# Patient Record
Sex: FEMALE | Race: WHITE | Employment: FULL TIME | URBAN - METROPOLITAN AREA
[De-identification: names, ages, dates, MRNs, and addresses within clinical notes are randomized per-mention and may not be internally consistent; named-entity substitution may affect disease eponyms.]

---

## 2018-01-31 ENCOUNTER — OFFICE VISIT (OUTPATIENT)
Dept: SURGERY | Age: 58
End: 2018-01-31

## 2018-01-31 VITALS
SYSTOLIC BLOOD PRESSURE: 130 MMHG | BODY MASS INDEX: 37.39 KG/M2 | HEIGHT: 64 IN | WEIGHT: 219 LBS | OXYGEN SATURATION: 94 % | DIASTOLIC BLOOD PRESSURE: 90 MMHG | TEMPERATURE: 98.3 F | RESPIRATION RATE: 20 BRPM | HEART RATE: 60 BPM

## 2018-01-31 DIAGNOSIS — Z98.84 S/P GASTRIC BYPASS: ICD-10-CM

## 2018-01-31 DIAGNOSIS — D50.9 IRON DEFICIENCY ANEMIA, UNSPECIFIED IRON DEFICIENCY ANEMIA TYPE: ICD-10-CM

## 2018-01-31 DIAGNOSIS — G35 MS (MULTIPLE SCLEROSIS) (HCC): ICD-10-CM

## 2018-01-31 DIAGNOSIS — E66.01 MORBID OBESITY (HCC): Primary | ICD-10-CM

## 2018-01-31 DIAGNOSIS — E53.8 VITAMIN B12 DEFICIENCY: ICD-10-CM

## 2018-01-31 DIAGNOSIS — K90.9 INTESTINAL MALABSORPTION, UNSPECIFIED TYPE: ICD-10-CM

## 2018-01-31 DIAGNOSIS — E51.9 VITAMIN B1 DEFICIENCY: ICD-10-CM

## 2018-01-31 DIAGNOSIS — R10.12 LEFT UPPER QUADRANT PAIN: ICD-10-CM

## 2018-01-31 DIAGNOSIS — E55.9 VITAMIN D DEFICIENCY: ICD-10-CM

## 2018-01-31 DIAGNOSIS — K50.919 CROHN'S DISEASE WITH COMPLICATION, UNSPECIFIED GASTROINTESTINAL TRACT LOCATION (HCC): ICD-10-CM

## 2018-01-31 RX ORDER — GABAPENTIN 600 MG/1
400 TABLET ORAL 3 TIMES DAILY
COMMUNITY

## 2018-01-31 NOTE — MR AVS SNAPSHOT
[address] [facility] [id number] 
[phone] Patient: Kalin Herrera MRN: [id number] :1960 Visit Information Date & Time Provider Department Dept. Phone Encounter #  
 2018 11:20 AM Noy Howell NP [facility] 22 [phone] [id number] Upcoming Health Maintenance Date Due Hepatitis C Screening 1960 DTaP/Tdap/Td series (1 - Tdap) 1982 PAP AKA CERVICAL CYTOLOGY 1982 FOBT Q 1 YEAR AGE 50-75 2011 BREAST CANCER SCRN MAMMOGRAM 2016 Influenza Age 5 to Adult 2017 Allergies as of 2018  Review Complete On: 2018 By: Nory Hinson LPN Severity Noted Reaction Type Reactions Morphine  2011    Itching Other Medication  2011    Nausea and Vomiting  
 anesthesia Current Immunizations  Never Reviewed Name Date Influenza Vaccine (Quad) PF 10/6/2016 12:02 PM  
  
 Not reviewed this visit You Were Diagnosed With   
  
 Codes Comments Morbid obesity ([facility] [address]    -  Primary ICD-10-CM: E66.01 
ICD-9-CM: 278.01 Left upper quadrant pain     ICD-10-CM: R10.12 ICD-9-CM: 789.02 Intestinal malabsorption, unspecified type     ICD-10-CM: K90.9 ICD-9-CM: 579.9 S/P gastric bypass     ICD-10-CM: Z26.03 ICD-9-CM: V45.86 Crohn's disease with complication, unspecified gastrointestinal tract location [facility])     ICD-10-CM: E63.662 ICD-9-CM: 555.9 MS (multiple sclerosis) ([facility] [address]     ICD-10-CM: G35 
ICD-9-CM: 482 Vitamin B12 deficiency     ICD-10-CM: E53.8 ICD-9-CM: 266.2 Vitamin D deficiency     ICD-10-CM: E55.9 ICD-9-CM: 268.9 Iron deficiency anemia, unspecified iron deficiency anemia type     ICD-10-CM: D50.9 ICD-9-CM: 280.9 Vitamin B1 deficiency     ICD-10-CM: E51.9 ICD-9-CM: 265.1 BMI 38.0-38.9,adult     ICD-10-CM: Q56.92 
ICD-9-CM: V85.38 Vitals BP Pulse Temp Resp Height(growth percentile) Weight(growth percentile) 130/90 60 98.3 °F (36.8 °C) 20 5' 3.5\" (1.613 m) 219 lb (99.3 kg) SpO2 BMI OB Status Smoking Status 94% 38.19 kg/m2 Hysterectomy Never Smoker Vitals History BMI and BSA Data Body Mass Index Body Surface Area  
 38.19 kg/m 2 2.11 m 2 Preferred Pharmacy Pharmacy Name Phone [facility] - [place] [address], [address] [phone] Your Updated Medication List  
  
   
This list is accurate as of: 1/31/18 11:59 AM.  Always use your most recent med list.  
  
  
  
  
 ALPRAZolam 0.5 mg tablet Commonly known as:  Jaclyn Feather Take 0.5 mg by mouth nightly as needed for Anxiety. CHOLESTYRAMINE LIGHT 4 gram powder Generic drug:  Cholestyramine-Aspartame Take 9 g by mouth two (2) times daily (with meals). cyclobenzaprine 10 mg tablet Commonly known as:  FLEXERIL Take  by mouth three (3) times daily as needed for Muscle Spasm(s). CYMBALTA 60 mg capsule Generic drug:  DULoxetine Take 60 mg by mouth nightly.  
  
 ergocalciferol 50,000 unit capsule Commonly known as:  ERGOCALCIFEROL Take 1 Cap by mouth every Monday, Wednesday, Friday. Indications: VITAMIN D DEFICIENCY (HIGH DOSE THERAPY) FIORICET -40 mg per tablet Generic drug:  butalbital-acetaminophen-caffeine Take 1 Tab by mouth.  
  
 gabapentin 600 mg tablet Commonly known as:  NEURONTIN Take  by mouth three (3) times daily. GILENYA PO Take 0.5 mg by mouth daily. MULTIVITAMIN PO Take  by mouth. OCREVUS IV  
by IntraVENous route. oxyCODONE-acetaminophen 5-325 mg per tablet Commonly known as:  PERCOCET Take 1-2 Tabs by mouth every four (4) hours as needed. Max Daily Amount: 12 Tabs. PENTASA PO Take 250 mg by mouth four (4) times daily. trimethoprim 100 mg tablet Commonly known as:  TRIMPEX Take 100 mg by mouth nightly. URIBEL 118-10-40.8-36 mg Cap capsule Generic drug:  Mth-Me Blue-Sod Phos-PhSal-Hyo  
TAKE ONE CAPSULE BY MOUTH THREE TIMES A DAY AS NEEDED  
  
 VITAMIN B-12 1,000 mcg/mL injection Generic drug:  cyanocobalamin  
1,000 mcg by IntraMUSCular route every fourteen (14) days. Indications: VITAMIN B12 DEFICIENCY We Performed the Following CBC W/O DIFF [23062 CPT(R)] IRON PROFILE [id number] CPT(R)] METABOLIC PANEL, COMPREHENSIVE [75453 CPT(R)] PTH INTACT [35533 CPT(R)] VITAMIN B1, WHOLE BLOOD [id number] CPT(R)] VITAMIN B12 & FOLATE [32724 CPT(R)] VITAMIN D, 25 HYDROXY [id number] CPT(R)] To-Do List   
 01/31/2018 Imaging:  CT ABD PELV W WO CONT Referral Information Referral ID Referred By Referred To [id number] Farhatann[facility] Walkerrobbie Not Available Visits Status Start Date End Date 1 New Request 1/31/18 1/31/19 If your referral has a status of pending review or denied, additional information will be sent to support the outcome of this decision. Introducing [facility]! Dear Leisa Eric: 
Thank you for requesting a [facility] account. Our records indicate that you already have an active [facility] account. You can access your account anytime at [email or web address]. [email or web address] Did you know that you can access your hospital and ER discharge instructions at any time in [facility]? You can also review all of your test results from your hospital stay or ER visit. Additional Information If you have questions, please visit the Frequently Asked Questions section of the [facility] website at [email or web address]. [email or web address] Remember, [facility] is NOT to be used for urgent needs. For medical emergencies, dial 911. Now available from your iPhone and Android! Please provide this summary of care documentation to your next provider. Your primary care clinician is listed as Deo Flores.  If you have any questions after today's visit, please call [phone].

## 2018-01-31 NOTE — PATIENT INSTRUCTIONS
CT Scan of the Abdomen: About This Test  What is it? A CT (computed tomography) scan uses X-rays to make detailed pictures of your body and structures inside your body. A CT scan of the abdomen (belly) can give your doctor information about your liver, pancreas, kidneys, and other structures in your belly. During the test, you will lie on a table that is attached to the [employer]. The CT scanner is a large doughnut-shaped machine. Why is this test done? A CT scan of the belly can help find problems such as kidney stones, infected pouches in the colon (diverticulitis), and appendicitis. It also helps find tumors and abscesses. How can you prepare for the test?  Talk to your doctor about all your health conditions before the test. For example, tell your doctor if:  · You are or might be pregnant. · You are allergic to any medicines. · You have diabetes. · You take metformin. · You are breastfeeding. · You get nervous in confined spaces. You may need medicine to help you relax. · You have had an X-ray test using barium contrast material in the past 4 days. You may be asked to not eat any solid foods starting the night before your scan. What happens before the test?  · You may have to take off jewelry. · You will take off all or most of your clothes and change into a gown. If you do leave some clothes on, make sure you take everything out of your pockets. · You may have contrast material (dye) put into your arm through a tube called an IV. Or, you may drink the contrast material or it may be put through a tube into your bladder or rectum. Contrast material helps doctors see specific organs, blood vessels, and most tumors. What happens during the test?  · You will lie on a table that is attached to the CT scanner. · The table slides into the round opening of the scanner. The table will move during the scan. The scanner moves inside the doughnut-shaped casing around your body.   · You will be asked to hold still during the scan. You may be asked to hold your breath for short periods. · You may be alone in the scanning room, but a technologist will be watching you through a window and talking with you during the test.  What else should you know about the test?  · A CT scan does not hurt. · If a dye is used, you may feel a quick sting or pinch when the IV is started. The dye may make you feel warm and flushed and give you a metallic taste in your mouth. Some people feel sick to their stomach or get a headache. · If you breastfeed and are concerned about whether the dye used in this test is safe, talk to your doctor. Most experts believe that very little dye passes into breast milk and even less is passed on to the baby. But if you prefer, you can store some of your breast milk ahead of time and use it for a day or two after the test.  · The dose of radiation from a CT scanner may be higher than that from other X-ray tests. If you are concerned about the radiation risk, talk to your doctor. How long does the test take? · The test will take about 30 to 60 minutes. Most of this time is spent getting ready for the scan. The actual test only takes a few minutes. What happens after the test?  · You will probably be able to go home right away. · You can go back to your usual activities right away. · Drink plenty of fluids for 24 hours after the test if dye was used, unless your doctor tells you not to. When should you call for help? Watch closely for changes in your health, and be sure to contact your doctor if you have any problems. Follow-up care is a key part of your treatment and safety. Be sure to make and go to all appointments, and call your doctor if you are having problems. It's also a good idea to keep a list of the medicines you take. Ask your doctor when you can expect to have your test results. Where can you learn more? Go to http://howard-jessica.info/.   Enter O406 in the search box to learn more about \"CT Scan of the Abdomen: About This Test.\"  Current as of: October 14, 2016  Content Version: 11.4  © 7891-3659 [employer]wise, [employer]. Care instructions adapted under license by [employer] (which disclaims liability or warranty for this information). If you have questions about a medical condition or this instruction, always ask your healthcare professional. [place] [address] any warranty or liability for your use of this information.

## 2018-01-31 NOTE — PROGRESS NOTES
Cydney Lyons is a 63 y.o. female 13 years status post gastric bypass, down 79.5 pounds. 1 year 3 months s/p removal of silastic ring removal. Weight today is 219 pounds. Patient comes in office today with complaint of intermittent left upper quadrant pain. Patient stated that pain is sharp and associated with eating. Stated pain most happens approximately 15 minutes after eating and occurs with more textured foods. Stated pain will not occur with eating soups and other soft foods. No pain medications in use. Stated pain may last 15-30 minutes. Denies nausea, no vomiting, no heartburn/reflux. Denies dysphagia. No fever/no chills, no shortness of breath, no chest pain. Current under care of neurologist and urologist due to poor bladder emptying, stated she is to have neuro stimulator placed for bladder. Patient stated she also need to follow up with GI Dr. Tej Pastor, patient with history of Crohns. Currently on Pentasa. Stated eating a lot of tuna fish for protein. Protein supplementation not in use. Drinking at lest 60 ounces of water daily. No soda drinking. Denies eating/drinking together. Tolerating all vitamins and medications. Denies smoking, no alcohol, and no NSAID usage. No issue with bowel habits. HPI    Review of Systems   Constitutional: Negative for chills, fever and malaise/fatigue. Respiratory: Negative for cough, sputum production and shortness of breath. Cardiovascular: Negative for chest pain, palpitations and leg swelling. Gastrointestinal: Positive for abdominal pain. Negative for blood in stool, constipation, diarrhea, heartburn, nausea and vomiting. Genitourinary: Positive for dysuria and urgency. Negative for flank pain. Musculoskeletal: Positive for myalgias. Neurological: Negative for dizziness. Physical Exam   Constitutional: She is oriented to person, place, and time. She appears well-developed and well-nourished. No distress.    Cardiovascular: Normal rate, regular rhythm and normal heart sounds. Pulmonary/Chest: Effort normal and breath sounds normal. No respiratory distress. She has no wheezes. She has no rales. Abdominal: Soft. Bowel sounds are normal. She exhibits no distension. There is tenderness. There is no rebound and no guarding. Previous surgical sites dry and intact. No hernia/masses palpated. Mild tenderness with deep palpation to left upper quadrant only. Musculoskeletal: Normal range of motion. She exhibits no edema. Neurological: She is alert and oriented to person, place, and time. Skin: Skin is warm and dry. No rash noted. No erythema. Psychiatric: She has a normal mood and affect. Her behavior is normal. Thought content normal.   Blood pressure 130/90, pulse 60, temperature 98.3 °F (36.8 °C), resp. rate 20, height 5' 3.5\" (1.613 m), weight 219 lb (99.3 kg), SpO2 94 %. ASSESSMENT and PLAN  Morbid Obesity 13 years status post gastric bypass, down 79.5 pounds. 1 year 3 months s/p removal of silastic ring removal. Weight today is 219 pounds. Left upper quadrant pain    Patient to be scheduled for CT scan of abdomen to evaluate gastric bypass anatomy, hernia, etc. Advised patient to continue with soft diet as tolerated with emphasis on protein. Continue with hydration and behaviors of no eating/drinking together. Continue vitamin regiment daily. Provided patient with routine lab work slip. Advised anything concerning with labs, will contact patient prior to next visit. Patient to also follow up with GI specialist for possible endoscopy. Patient to follow up in status post CT scan. year. Patient verbalized understanding and questions were answered to the best of my knowledge and ability. CT scan educational materials were provided. Advised to call office if any questions/concerns. 19 minutes was spent with patient.

## 2018-01-31 NOTE — PROGRESS NOTES
1. Have you been to the ER, urgent care clinic since your last visit? Hospitalized since your last visit? yes Pt first for pneumonia    2. Have you seen or consulted any other health care providers outside of the [address] [place] [address] since your last visit? Include any pap smears or colon screening.  Neurology,

## 2018-02-19 ENCOUNTER — HOSPITAL ENCOUNTER (OUTPATIENT)
Dept: CT IMAGING | Age: 58
Discharge: HOME | End: 2018-02-19
Attending: NURSE PRACTITIONER
Payer: COMMERCIAL

## 2018-02-19 DIAGNOSIS — K90.9 INTESTINAL MALABSORPTION, UNSPECIFIED TYPE: ICD-10-CM

## 2018-02-19 DIAGNOSIS — E53.8 VITAMIN B12 DEFICIENCY: ICD-10-CM

## 2018-02-19 DIAGNOSIS — K50.919 CROHN'S DISEASE WITH COMPLICATION, UNSPECIFIED GASTROINTESTINAL TRACT LOCATION (HCC): ICD-10-CM

## 2018-02-19 DIAGNOSIS — E66.01 MORBID OBESITY (HCC): ICD-10-CM

## 2018-02-19 DIAGNOSIS — D50.9 IRON DEFICIENCY ANEMIA, UNSPECIFIED IRON DEFICIENCY ANEMIA TYPE: ICD-10-CM

## 2018-02-19 DIAGNOSIS — E55.9 VITAMIN D DEFICIENCY: ICD-10-CM

## 2018-02-19 DIAGNOSIS — R10.12 LEFT UPPER QUADRANT PAIN: ICD-10-CM

## 2018-02-19 DIAGNOSIS — G35 MS (MULTIPLE SCLEROSIS) (HCC): ICD-10-CM

## 2018-02-19 DIAGNOSIS — Z98.84 S/P GASTRIC BYPASS: ICD-10-CM

## 2018-02-19 PROCEDURE — 74011000258 HC RX REV CODE- 258: Performed by: NURSE PRACTITIONER

## 2018-02-19 PROCEDURE — 74011636320 HC RX REV CODE- 636/320: Performed by: NURSE PRACTITIONER

## 2018-02-19 PROCEDURE — 74177 CT ABD & PELVIS W/CONTRAST: CPT

## 2018-02-19 RX ORDER — SODIUM CHLORIDE 0.9 % (FLUSH) 0.9 %
10 SYRINGE (ML) INJECTION
Status: COMPLETED | OUTPATIENT
Start: 2018-02-19 | End: 2018-02-19

## 2018-02-19 RX ADMIN — SODIUM CHLORIDE 100 ML: 900 INJECTION, SOLUTION INTRAVENOUS at 10:04

## 2018-02-19 RX ADMIN — Medication 10 ML: at 10:04

## 2018-02-19 RX ADMIN — IOHEXOL 50 ML: 240 INJECTION, SOLUTION INTRATHECAL; INTRAVASCULAR; INTRAVENOUS; ORAL at 08:22

## 2018-02-19 RX ADMIN — IOPAMIDOL 100 ML: 755 INJECTION, SOLUTION INTRAVENOUS at 10:04

## 2018-02-22 ENCOUNTER — TELEPHONE (OUTPATIENT)
Dept: SURGERY | Age: 58
End: 2018-02-22

## 2018-02-22 LAB
25(OH)D3+25(OH)D2 SERPL-MCNC: 5.7 NG/ML (ref 30–100)
ALBUMIN SERPL-MCNC: 4.4 G/DL (ref 3.5–5.5)
ALBUMIN/GLOB SERPL: 1.8 {RATIO} (ref 1.2–2.2)
ALP SERPL-CCNC: 92 IU/L (ref 39–117)
ALT SERPL-CCNC: 9 IU/L (ref 0–32)
AST SERPL-CCNC: 15 IU/L (ref 0–40)
BILIRUB SERPL-MCNC: 0.4 MG/DL (ref 0–1.2)
BUN SERPL-MCNC: 16 MG/DL (ref 6–24)
BUN/CREAT SERPL: 23 (ref 9–23)
CALCIUM SERPL-MCNC: 9.1 MG/DL (ref 8.7–10.2)
CHLORIDE SERPL-SCNC: 103 MMOL/L (ref 96–106)
CO2 SERPL-SCNC: 23 MMOL/L (ref 18–29)
CREAT SERPL-MCNC: 0.7 MG/DL (ref 0.57–1)
ERYTHROCYTE [DISTWIDTH] IN BLOOD BY AUTOMATED COUNT: 15.6 % (ref 12.3–15.4)
FOLATE SERPL-MCNC: 9.1 NG/ML
GFR SERPLBLD CREATININE-BSD FMLA CKD-EPI: 111 ML/MIN/{1.73_M2}
GFR SERPLBLD CREATININE-BSD FMLA CKD-EPI: 96 ML/MIN/{1.73_M2}
GLOBULIN SER CALC-MCNC: 2.4 G/L (ref 1.5–4.5)
GLUCOSE SERPL-MCNC: 84 MG/DL (ref 65–99)
HCT VFR BLD AUTO: 34.1 % (ref 34–46.6)
HGB BLD-MCNC: 10.4 G/DL (ref 11.1–15.9)
IRON SATN MFR SERPL: 6 % (ref 15–55)
IRON SERPL-MCNC: 27 UG/DL (ref 27–159)
MCH RBC QN AUTO: 24.7 PG (ref 26.6–33)
MCHC RBC AUTO-ENTMCNC: 30.5 G/DL (ref 31.5–35.7)
MCV RBC AUTO: 81 FL (ref 79–97)
PLATELET # BLD AUTO: 420 X10E3/UL (ref 150–379)
POTASSIUM SERPL-SCNC: 4.8 MMOL/L (ref 3.5–5.2)
PROT SERPL-MCNC: 6.8 G/DL (ref 6–8.5)
PTH-INTACT SERPL-MCNC: 30 PG/ML (ref 15–65)
RBC # BLD AUTO: 4.21 X10E6/UL (ref 3.77–5.28)
SODIUM SERPL-SCNC: 143 MMOL/L (ref 134–144)
TIBC SERPL-MCNC: 462 UG/DL (ref 250–450)
UIBC SERPL-MCNC: 435 UG/DL (ref 131–425)
VIT B1 BLD-SCNC: 112.5 NMOL/L (ref 66.5–200)
VIT B12 SERPL-MCNC: 413 PG/ML (ref 232–1245)
WBC # BLD AUTO: 5.2 X10E3/UL (ref 3.4–10.8)

## 2018-02-22 NOTE — TELEPHONE ENCOUNTER
Telephone call with patient to discuss abdominal/pelvic CT scan results and blood work results. CT scan results:1. No acute abdominal or pelvic abnormality. 2. Status post gastric bypass without bowel obstruction. 3. Status post cholecystectomy without biliary duct dilatation. 4. Small right renal angiomyolipoma. 5. Tiny non obstructing renal calculi. 6. Atherosclerotic abdominal aorta without aneurysm. 7. Status post hysterectomy. 8. Status post appendectomy    Patient that her left abdominal pain has decrease in intensity and frequency. Denies any nausea or vomiting. Patient stated she is setting up appointment with GI for endoscopy. Also patient with previous history of kidney stone. Blood work with low Vitamin D level. Patient stated primary care provider placed her on Vitamin D 50,000 units 3 times a week. Advised patient to continue rest of bariatric vitamin regime. Patient to follow up status post endoscopy. Advised if any questions or concerns to call the office.

## 2019-03-21 RX ORDER — SODIUM CHLORIDE 9 MG/ML
25 INJECTION, SOLUTION INTRAVENOUS CONTINUOUS
Status: DISCONTINUED | OUTPATIENT
Start: 2019-03-26 | End: 2019-03-26

## 2019-03-21 RX ORDER — DIPHENHYDRAMINE HCL 25 MG
50 CAPSULE ORAL ONCE
Status: DISCONTINUED | OUTPATIENT
Start: 2019-03-26 | End: 2019-03-26

## 2019-03-21 RX ORDER — ACETAMINOPHEN 325 MG/1
650 TABLET ORAL
Status: DISCONTINUED | OUTPATIENT
Start: 2019-03-21 | End: 2019-03-26

## 2019-03-21 RX ORDER — ACETAMINOPHEN 325 MG/1
650 TABLET ORAL ONCE
Status: DISCONTINUED | OUTPATIENT
Start: 2019-03-26 | End: 2019-03-26

## 2019-03-21 RX ORDER — DIPHENHYDRAMINE HYDROCHLORIDE 50 MG/ML
50 INJECTION, SOLUTION INTRAMUSCULAR; INTRAVENOUS
Status: DISCONTINUED | OUTPATIENT
Start: 2019-03-26 | End: 2019-03-26

## 2019-03-26 ENCOUNTER — HOSPITAL ENCOUNTER (OUTPATIENT)
Dept: INFUSION THERAPY | Age: 59
Discharge: HOME | End: 2019-03-26
Payer: COMMERCIAL

## 2019-03-26 VITALS
RESPIRATION RATE: 16 BRPM | TEMPERATURE: 96.8 F | OXYGEN SATURATION: 99 % | SYSTOLIC BLOOD PRESSURE: 150 MMHG | HEART RATE: 90 BPM | DIASTOLIC BLOOD PRESSURE: 85 MMHG

## 2019-03-26 PROCEDURE — 96365 THER/PROPH/DIAG IV INF INIT: CPT

## 2019-03-26 PROCEDURE — 74011250637 HC RX REV CODE- 250/637: Performed by: PSYCHIATRY & NEUROLOGY

## 2019-03-26 PROCEDURE — 74011250636 HC RX REV CODE- 250/636: Performed by: PSYCHIATRY & NEUROLOGY

## 2019-03-26 PROCEDURE — 96375 TX/PRO/DX INJ NEW DRUG ADDON: CPT

## 2019-03-26 PROCEDURE — 96366 THER/PROPH/DIAG IV INF ADDON: CPT

## 2019-03-26 RX ORDER — ORPHENADRINE CITRATE 100 MG/1
100 TABLET, EXTENDED RELEASE ORAL DAILY
COMMUNITY

## 2019-03-26 RX ORDER — BENZONATATE 100 MG/1
100 CAPSULE ORAL
COMMUNITY

## 2019-03-26 RX ORDER — MODAFINIL 200 MG/1
200 TABLET ORAL DAILY
COMMUNITY

## 2019-03-26 RX ORDER — DIPHENHYDRAMINE HYDROCHLORIDE 50 MG/ML
50 INJECTION, SOLUTION INTRAMUSCULAR; INTRAVENOUS ONCE
Status: COMPLETED | OUTPATIENT
Start: 2019-03-26 | End: 2019-03-26

## 2019-03-26 RX ORDER — SODIUM CHLORIDE 9 MG/ML
25 INJECTION, SOLUTION INTRAVENOUS CONTINUOUS
Status: DISCONTINUED | OUTPATIENT
Start: 2019-03-26 | End: 2019-03-27 | Stop reason: HOSPADM

## 2019-03-26 RX ORDER — TRAMADOL HYDROCHLORIDE 50 MG/1
50 TABLET ORAL
COMMUNITY

## 2019-03-26 RX ORDER — SCOLOPAMINE TRANSDERMAL SYSTEM 1 MG/1
1 PATCH, EXTENDED RELEASE TRANSDERMAL
COMMUNITY

## 2019-03-26 RX ORDER — DIPHENHYDRAMINE HCL 25 MG
50 CAPSULE ORAL ONCE
Status: DISCONTINUED | OUTPATIENT
Start: 2019-03-26 | End: 2019-03-26

## 2019-03-26 RX ORDER — PROMETHAZINE HYDROCHLORIDE 12.5 MG/1
25 SUPPOSITORY RECTAL
COMMUNITY

## 2019-03-26 RX ORDER — ACETAMINOPHEN 325 MG/1
650 TABLET ORAL
Status: DISCONTINUED | OUTPATIENT
Start: 2019-03-26 | End: 2019-03-30 | Stop reason: HOSPADM

## 2019-03-26 RX ORDER — ALBUTEROL SULFATE 90 UG/1
2 AEROSOL, METERED RESPIRATORY (INHALATION)
COMMUNITY

## 2019-03-26 RX ORDER — AMANTADINE HYDROCHLORIDE 100 MG/1
100 CAPSULE, GELATIN COATED ORAL 2 TIMES DAILY
COMMUNITY

## 2019-03-26 RX ORDER — DIPHENHYDRAMINE HYDROCHLORIDE 50 MG/ML
50 INJECTION, SOLUTION INTRAMUSCULAR; INTRAVENOUS
Status: DISCONTINUED | OUTPATIENT
Start: 2019-03-26 | End: 2019-03-30 | Stop reason: HOSPADM

## 2019-03-26 RX ORDER — SODIUM CHLORIDE 0.9 % (FLUSH) 0.9 %
5-10 SYRINGE (ML) INJECTION AS NEEDED
Status: DISCONTINUED | OUTPATIENT
Start: 2019-03-26 | End: 2019-03-27 | Stop reason: HOSPADM

## 2019-03-26 RX ORDER — ACETAMINOPHEN 325 MG/1
650 TABLET ORAL ONCE
Status: COMPLETED | OUTPATIENT
Start: 2019-03-26 | End: 2019-03-26

## 2019-03-26 RX ADMIN — OCRELIZUMAB 600 MG: 300 INJECTION INTRAVENOUS at 11:40

## 2019-03-26 RX ADMIN — Medication 10 ML: at 16:04

## 2019-03-26 RX ADMIN — Medication 10 ML: at 10:31

## 2019-03-26 RX ADMIN — METHYLPREDNISOLONE SODIUM SUCCINATE 125 MG: 125 INJECTION, POWDER, FOR SOLUTION INTRAMUSCULAR; INTRAVENOUS at 11:21

## 2019-03-26 RX ADMIN — SODIUM CHLORIDE 25 ML/HR: 900 INJECTION, SOLUTION INTRAVENOUS at 10:32

## 2019-03-26 RX ADMIN — DIPHENHYDRAMINE HYDROCHLORIDE 50 MG: 50 INJECTION, SOLUTION INTRAMUSCULAR; INTRAVENOUS at 11:20

## 2019-03-26 RX ADMIN — ACETAMINOPHEN 650 MG: 325 TABLET ORAL at 11:17

## 2019-03-26 NOTE — PROGRESS NOTES
Outpatient Infusion Center Progress Note 1020 Pt admit to [facility] for [address]. Pt ambulatory with cane and in stable condition. Assessment completed. Pt has had [address] in the past. Per patient, last dose was given in September. Lisa Cruz, KashmirD researched this and spoke with MD's office to confirm. Pt is on MAINTENANCE Ocrevus. Oriented to [facility]. Pt advises she has had reaction to infusion in past when given PO Benadryl. Pharmacist made aware. Recent knee replacement. Some swelling noted. No other concerns voiced. Peripheral IV established with positive blood return. Line connected to NS at [facility]. Patient Vitals for the past 12 hrs: 
 Temp Pulse Resp BP SpO2  
03/26/19 1601  90 16 150/85   
03/26/19 1415  82 16 135/79   
03/26/19 1340  85 16 135/80   
03/26/19 1249 96.8 °F (36 °C) 95 16 135/75   
03/26/19 1212 97 °F (36.1 °C) 76 16 137/72   
03/26/19 1022 97.2 °F (36.2 °C) 89 18 131/78 99 % Medications: 
NS at [facility] Tylenol 650 MG PO Benadryl 50 MG IVP Solu-medrol 125 MG IVP Ocrevus (titrated per [facility]) 1605 Pt tolerated treatment well. PIV maintained positive blood return throughout treatment. Line flushed and removed. D/c home ambulatory in no distress. Pt aware of next appointment scheduled for 10/01/19 at 1000.

## 2019-09-26 RX ORDER — ACETAMINOPHEN 325 MG/1
650 TABLET ORAL ONCE
Status: COMPLETED | OUTPATIENT
Start: 2019-10-01 | End: 2019-10-01

## 2019-09-26 RX ORDER — DIPHENHYDRAMINE HYDROCHLORIDE 50 MG/ML
50 INJECTION, SOLUTION INTRAMUSCULAR; INTRAVENOUS ONCE
Status: COMPLETED | OUTPATIENT
Start: 2019-10-01 | End: 2019-10-01

## 2019-09-26 RX ORDER — ACETAMINOPHEN 325 MG/1
650 TABLET ORAL
Status: DISCONTINUED | OUTPATIENT
Start: 2019-10-01 | End: 2019-10-05 | Stop reason: HOSPADM

## 2019-09-26 RX ORDER — DIPHENHYDRAMINE HYDROCHLORIDE 50 MG/ML
50 INJECTION, SOLUTION INTRAMUSCULAR; INTRAVENOUS
Status: DISCONTINUED | OUTPATIENT
Start: 2019-10-01 | End: 2019-10-05 | Stop reason: HOSPADM

## 2019-09-26 RX ORDER — SODIUM CHLORIDE 9 MG/ML
25 INJECTION, SOLUTION INTRAVENOUS CONTINUOUS
Status: DISCONTINUED | OUTPATIENT
Start: 2019-10-01 | End: 2019-10-02 | Stop reason: HOSPADM

## 2019-10-01 ENCOUNTER — HOSPITAL ENCOUNTER (OUTPATIENT)
Dept: INFUSION THERAPY | Age: 59
Discharge: HOME | End: 2019-10-01
Payer: COMMERCIAL

## 2019-10-01 VITALS
WEIGHT: 215 LBS | BODY MASS INDEX: 37.49 KG/M2 | DIASTOLIC BLOOD PRESSURE: 76 MMHG | TEMPERATURE: 98.5 F | HEART RATE: 84 BPM | SYSTOLIC BLOOD PRESSURE: 135 MMHG | OXYGEN SATURATION: 96 % | RESPIRATION RATE: 18 BRPM

## 2019-10-01 PROCEDURE — 96413 CHEMO IV INFUSION 1 HR: CPT

## 2019-10-01 PROCEDURE — 96375 TX/PRO/DX INJ NEW DRUG ADDON: CPT

## 2019-10-01 PROCEDURE — 74011250637 HC RX REV CODE- 250/637: Performed by: PSYCHIATRY & NEUROLOGY

## 2019-10-01 PROCEDURE — 74011250636 HC RX REV CODE- 250/636: Performed by: PSYCHIATRY & NEUROLOGY

## 2019-10-01 PROCEDURE — 96417 CHEMO IV INFUS EACH ADDL SEQ: CPT

## 2019-10-01 RX ADMIN — SODIUM CHLORIDE 25 ML/HR: 900 INJECTION, SOLUTION INTRAVENOUS at 09:59

## 2019-10-01 RX ADMIN — ACETAMINOPHEN 650 MG: 325 TABLET ORAL at 10:04

## 2019-10-01 RX ADMIN — METHYLPREDNISOLONE SODIUM SUCCINATE 125 MG: 125 INJECTION, POWDER, FOR SOLUTION INTRAMUSCULAR; INTRAVENOUS at 10:07

## 2019-10-01 RX ADMIN — DIPHENHYDRAMINE HYDROCHLORIDE 50 MG: 50 INJECTION, SOLUTION INTRAMUSCULAR; INTRAVENOUS at 10:05

## 2019-10-01 RX ADMIN — OCRELIZUMAB 600 MG: 300 INJECTION INTRAVENOUS at 10:45

## 2019-10-01 NOTE — PROGRESS NOTES
Outpatient Infusion Center Short Visit Progress Note    0995 Patient admitted to [facility] for Ocrevus maintenance dose ambulatory in stable condition. Assessment completed. No new concerns voiced. PIV established in right arm with positive blood return. Vital Signs:  Visit Vitals  /80 (BP 1 Location: Left arm, BP Patient Position: Sitting)   Pulse 62   Temp 96.9 °F (36.1 °C)   Resp 16   Wt 97.5 kg (215 lb)   SpO2 96%   Breastfeeding? No   BMI 37.49 kg/m²     Patient Vitals for the past 12 hrs:   Temp Pulse Resp BP SpO2   10/01/19 1442 98.5 °F (36.9 °C) 84 18 135/76 96 %   10/01/19 1415 97.2 °F (36.2 °C) 83 16 127/69 93 %   10/01/19 1345 97.9 °F (36.6 °C) 84 16 141/80 96 %   10/01/19 1315 98.4 °F (36.9 °C) 78 16 142/80 95 %   10/01/19 1245 97.1 °F (36.2 °C) 70 16 136/81 97 %   10/01/19 1215 97 °F (36.1 °C) 96 16 (!) 148/91 99 %   10/01/19 1145 97.3 °F (36.3 °C) 86 16 134/78 96 %   10/01/19 1115 97.1 °F (36.2 °C) 83 16 129/70 98 %   10/01/19 0949 96.9 °F (36.1 °C) 62 16 146/80 96 %       Medications:  NS at [facility]  Tylenol PO  Benadryl IVP  Solu-medrol IVP  Ocrevus (titrated per order)    1450 Patient tolerated treatment well. PIV taken out with no issues. Patient discharged from [place] [address] ambulatory in no distress at 1450. Patient aware of next appointment 3/17/20 at 1000.

## 2020-03-13 NOTE — PROGRESS NOTES
lNew/updated order required for patient's upcoming [facility] appointment. Faxed doctor's office on 03/13/20 at 08:00 AM.  Refer to fax documents in pharmacy for further information.

## 2020-03-16 RX ORDER — ACETAMINOPHEN 325 MG/1
650 TABLET ORAL
Status: ACTIVE | OUTPATIENT
Start: 2020-03-17 | End: 2020-03-17

## 2020-03-16 RX ORDER — DIPHENHYDRAMINE HYDROCHLORIDE 50 MG/ML
50 INJECTION, SOLUTION INTRAMUSCULAR; INTRAVENOUS
Status: ACTIVE | OUTPATIENT
Start: 2020-03-17 | End: 2020-03-17

## 2020-03-16 RX ORDER — SODIUM CHLORIDE 9 MG/ML
25 INJECTION, SOLUTION INTRAVENOUS CONTINUOUS
Status: DISPENSED | OUTPATIENT
Start: 2020-03-17 | End: 2020-03-17

## 2020-03-16 RX ORDER — ACETAMINOPHEN 325 MG/1
650 TABLET ORAL ONCE
Status: COMPLETED | OUTPATIENT
Start: 2020-03-17 | End: 2020-03-17

## 2020-03-16 NOTE — PROGRESS NOTES
New/updated order required for patient's upcoming [facility] appointment. Called doctor's office on 03/16/20 at 8:42 AM and left message with [facility] fax ([phone]).     Shereen Huitron, PharmD

## 2020-03-17 ENCOUNTER — HOSPITAL ENCOUNTER (OUTPATIENT)
Dept: INFUSION THERAPY | Age: 60
Discharge: HOME | End: 2020-03-17
Payer: COMMERCIAL

## 2020-03-17 VITALS — HEART RATE: 70 BPM | DIASTOLIC BLOOD PRESSURE: 77 MMHG | TEMPERATURE: 98.7 F | SYSTOLIC BLOOD PRESSURE: 128 MMHG

## 2020-03-17 PROCEDURE — 96375 TX/PRO/DX INJ NEW DRUG ADDON: CPT

## 2020-03-17 PROCEDURE — 96365 THER/PROPH/DIAG IV INF INIT: CPT

## 2020-03-17 PROCEDURE — 74011250636 HC RX REV CODE- 250/636: Performed by: PSYCHIATRY & NEUROLOGY

## 2020-03-17 PROCEDURE — 96413 CHEMO IV INFUSION 1 HR: CPT

## 2020-03-17 PROCEDURE — 74011250637 HC RX REV CODE- 250/637: Performed by: PSYCHIATRY & NEUROLOGY

## 2020-03-17 PROCEDURE — 96366 THER/PROPH/DIAG IV INF ADDON: CPT

## 2020-03-17 PROCEDURE — 96415 CHEMO IV INFUSION ADDL HR: CPT

## 2020-03-17 PROCEDURE — 74011250636 HC RX REV CODE- 250/636

## 2020-03-17 RX ORDER — DIPHENHYDRAMINE HYDROCHLORIDE 50 MG/ML
50 INJECTION, SOLUTION INTRAMUSCULAR; INTRAVENOUS ONCE
Status: COMPLETED | OUTPATIENT
Start: 2020-03-17 | End: 2020-03-17

## 2020-03-17 RX ADMIN — METHYLPREDNISOLONE SODIUM SUCCINATE 125 MG: 125 INJECTION, POWDER, FOR SOLUTION INTRAMUSCULAR; INTRAVENOUS at 10:03

## 2020-03-17 RX ADMIN — OCRELIZUMAB 600 MG: 300 INJECTION INTRAVENOUS at 11:17

## 2020-03-17 RX ADMIN — SODIUM CHLORIDE 25 ML/HR: 900 INJECTION, SOLUTION INTRAVENOUS at 10:27

## 2020-03-17 RX ADMIN — DIPHENHYDRAMINE HYDROCHLORIDE 50 MG: 50 INJECTION, SOLUTION INTRAMUSCULAR; INTRAVENOUS at 10:28

## 2020-03-17 RX ADMIN — ACETAMINOPHEN 650 MG: 325 TABLET ORAL at 10:03

## 2020-03-17 NOTE — PROGRESS NOTES
050 Pt admit to [facility] for [address] [facility] ambulatory in stable condition. Assessment completed. No new concerns voiced. Peripheral IV  with positive blood return. Normal Saline started at [facility]. Visit Vitals  /77   Pulse 70   Temp 98.7 °F (37.1 °C)   Breastfeeding No       Medications:  Medications Administered     0.9% sodium chloride infusion     Admin Date  03/17/2020 Action  New Bag Dose  25 mL/hr Rate  25 mL/hr Route  IntraVENous Administered By  Mo Rome RN          acetaminophen (TYLENOL) tablet 650 mg     Admin Date  03/17/2020 Action  Given Dose  650 mg Route  Oral Administered By  Mo Rome RN          diphenhydrAMINE (BENADRYL) injection 50 mg     Admin Date  03/17/2020 Action  Given Dose  50 mg Route  IntraVENous Administered By  Mo Rome RN          methylPREDNISolone (PF) (Solu-MEDROL) injection 125 mg     Admin Date  03/17/2020 Action  Given Dose  125 mg Route  IntraVENous Administered By  Mo Rome RN          ocrelizumab (OCREVUS) 600 mg, overfill volume 50 mL in 0.9% sodium chloride 500 mL infusion     Admin Date  03/17/2020 Action  New Bag Dose  600 mg Route  IntraVENous Administered By  Mo Rome RN                  1600 Pt tolerated treatment well. Peripheral IV maintained positive blood return throughout treatment, flushed with positive blood return and removed  at conclusion. D/c home ambulatory in no distress. Pt aware of next [facility] appointment scheduled for 9/13/20.

## 2020-09-13 ENCOUNTER — APPOINTMENT (OUTPATIENT)
Dept: INFUSION THERAPY | Age: 60
End: 2020-09-13

## 2020-09-17 RX ORDER — SODIUM CHLORIDE 9 MG/ML
25 INJECTION, SOLUTION INTRAVENOUS CONTINUOUS
Status: DISCONTINUED | OUTPATIENT
Start: 2020-09-22 | End: 2020-09-23 | Stop reason: HOSPADM

## 2020-09-17 RX ORDER — ACETAMINOPHEN 325 MG/1
650 TABLET ORAL
Status: DISCONTINUED | OUTPATIENT
Start: 2020-09-22 | End: 2020-09-23 | Stop reason: HOSPADM

## 2020-09-17 RX ORDER — DIPHENHYDRAMINE HYDROCHLORIDE 50 MG/ML
50 INJECTION, SOLUTION INTRAMUSCULAR; INTRAVENOUS
Status: DISCONTINUED | OUTPATIENT
Start: 2020-09-22 | End: 2020-09-23 | Stop reason: HOSPADM

## 2020-09-17 RX ORDER — ACETAMINOPHEN 325 MG/1
650 TABLET ORAL ONCE
Status: COMPLETED | OUTPATIENT
Start: 2020-09-22 | End: 2020-09-22

## 2020-09-17 RX ORDER — DIPHENHYDRAMINE HYDROCHLORIDE 50 MG/ML
50 INJECTION, SOLUTION INTRAMUSCULAR; INTRAVENOUS ONCE
Status: COMPLETED | OUTPATIENT
Start: 2020-09-22 | End: 2020-09-22

## 2020-09-22 ENCOUNTER — HOSPITAL ENCOUNTER (OUTPATIENT)
Dept: INFUSION THERAPY | Age: 60
Discharge: HOME | End: 2020-09-22
Payer: COMMERCIAL

## 2020-09-22 VITALS
HEART RATE: 81 BPM | TEMPERATURE: 97.1 F | RESPIRATION RATE: 18 BRPM | SYSTOLIC BLOOD PRESSURE: 132 MMHG | DIASTOLIC BLOOD PRESSURE: 80 MMHG

## 2020-09-22 PROCEDURE — 96365 THER/PROPH/DIAG IV INF INIT: CPT

## 2020-09-22 PROCEDURE — 96375 TX/PRO/DX INJ NEW DRUG ADDON: CPT

## 2020-09-22 PROCEDURE — 74011250636 HC RX REV CODE- 250/636: Performed by: PSYCHIATRY & NEUROLOGY

## 2020-09-22 PROCEDURE — 96413 CHEMO IV INFUSION 1 HR: CPT

## 2020-09-22 PROCEDURE — 96415 CHEMO IV INFUSION ADDL HR: CPT

## 2020-09-22 PROCEDURE — 96366 THER/PROPH/DIAG IV INF ADDON: CPT

## 2020-09-22 PROCEDURE — 74011250637 HC RX REV CODE- 250/637: Performed by: PSYCHIATRY & NEUROLOGY

## 2020-09-22 RX ADMIN — METHYLPREDNISOLONE SODIUM SUCCINATE 125 MG: 125 INJECTION, POWDER, FOR SOLUTION INTRAMUSCULAR; INTRAVENOUS at 10:20

## 2020-09-22 RX ADMIN — ACETAMINOPHEN 650 MG: 325 TABLET ORAL at 10:19

## 2020-09-22 RX ADMIN — OCRELIZUMAB 600 MG: 300 INJECTION INTRAVENOUS at 10:50

## 2020-09-22 RX ADMIN — SODIUM CHLORIDE 25 ML/HR: 900 INJECTION, SOLUTION INTRAVENOUS at 10:15

## 2020-09-22 RX ADMIN — DIPHENHYDRAMINE HYDROCHLORIDE 50 MG: 50 INJECTION INTRAMUSCULAR; INTRAVENOUS at 10:21

## 2020-09-22 NOTE — PROGRESS NOTES
Outpatient Infusion Center Progress Note    1000 Pt admit to [facility] for Ocrevus infusion ambulatory in stable condition. Assessment completed. No new concerns voiced. PIV established in left FA with good blood return, maintained good blood return throughout infusion    Visit Vitals  /80   Pulse 81   Temp 97.1 °F (36.2 °C) Comment: increased to 80/hr   Resp 18   Breastfeeding No       Medications:  Medications Administered     0.9% sodium chloride infusion     Admin Date  09/22/2020 Action  New Bag Dose  25 mL/hr Rate  25 mL/hr Route  IntraVENous Administered By  Teresa Jaime RN          acetaminophen (TYLENOL) tablet 650 mg     Admin Date  09/22/2020 Action  Given Dose  650 mg Route  Oral Administered By  Teresa Jaime RN          diphenhydrAMINE (BENADRYL) injection 50 mg     Admin Date  09/22/2020 Action  Given Dose  50 mg Route  IntraVENous Administered By  Teresa Jaime RN          methylPREDNISolone (PF) (Solu-MEDROL) injection 125 mg     Admin Date  09/22/2020 Action  Given Dose  125 mg Route  IntraVENous Administered By  Teresa Jaime, LEATHA          ocrelizumab (OCREVUS) 600 mg, overfill volume 50 mL in 0.9% sodium chloride 500 mL infusion     Admin Date  09/22/2020 Action  New Bag Dose  600 mg Route  IntraVENous Administered By  Teresa Jaime, LEATHA                7634 Pt tolerated treatment well. D/c home ambulatory in no distress. Pt aware of next appointment scheduled for 6 months from now (her MD schedules it).

## 2021-03-17 RX ORDER — ACETAMINOPHEN 325 MG/1
650 TABLET ORAL
Status: ACTIVE | OUTPATIENT
Start: 2021-03-22 | End: 2021-03-22

## 2021-03-17 RX ORDER — SODIUM CHLORIDE 9 MG/ML
25 INJECTION, SOLUTION INTRAVENOUS CONTINUOUS
Status: DISPENSED | OUTPATIENT
Start: 2021-03-22 | End: 2021-03-22

## 2021-03-17 RX ORDER — DIPHENHYDRAMINE HYDROCHLORIDE 50 MG/ML
50 INJECTION, SOLUTION INTRAMUSCULAR; INTRAVENOUS
Status: ACTIVE | OUTPATIENT
Start: 2021-03-22 | End: 2021-03-22

## 2021-03-17 RX ORDER — DIPHENHYDRAMINE HCL 25 MG
50 CAPSULE ORAL ONCE
Status: ACTIVE | OUTPATIENT
Start: 2021-03-22 | End: 2021-03-22

## 2021-03-17 RX ORDER — ACETAMINOPHEN 325 MG/1
650 TABLET ORAL ONCE
Status: ACTIVE | OUTPATIENT
Start: 2021-03-22 | End: 2021-03-22

## 2021-03-22 ENCOUNTER — HOSPITAL ENCOUNTER (OUTPATIENT)
Dept: INFUSION THERAPY | Age: 61
Discharge: HOME | End: 2021-03-22

## 2021-03-22 RX ORDER — DIPHENHYDRAMINE HYDROCHLORIDE 50 MG/ML
50 INJECTION, SOLUTION INTRAMUSCULAR; INTRAVENOUS
Status: DISCONTINUED | OUTPATIENT
Start: 2021-03-24 | End: 2021-03-25 | Stop reason: HOSPADM

## 2021-03-22 RX ORDER — DIPHENHYDRAMINE HYDROCHLORIDE 50 MG/ML
50 INJECTION, SOLUTION INTRAMUSCULAR; INTRAVENOUS
Status: DISCONTINUED | OUTPATIENT
Start: 2021-03-25 | End: 2021-03-22

## 2021-03-22 RX ORDER — ACETAMINOPHEN 325 MG/1
650 TABLET ORAL
Status: DISCONTINUED | OUTPATIENT
Start: 2021-03-24 | End: 2021-03-25 | Stop reason: HOSPADM

## 2021-03-22 RX ORDER — DIPHENHYDRAMINE HCL 25 MG
50 CAPSULE ORAL ONCE
Status: DISCONTINUED | OUTPATIENT
Start: 2021-03-25 | End: 2021-03-22

## 2021-03-22 RX ORDER — DIPHENHYDRAMINE HCL 25 MG
50 CAPSULE ORAL ONCE
Status: ACTIVE | OUTPATIENT
Start: 2021-03-24 | End: 2021-03-24

## 2021-03-22 RX ORDER — ACETAMINOPHEN 325 MG/1
650 TABLET ORAL ONCE
Status: DISCONTINUED | OUTPATIENT
Start: 2021-03-25 | End: 2021-03-22

## 2021-03-22 RX ORDER — SODIUM CHLORIDE 9 MG/ML
25 INJECTION, SOLUTION INTRAVENOUS CONTINUOUS
Status: DISCONTINUED | OUTPATIENT
Start: 2021-03-24 | End: 2021-03-25 | Stop reason: HOSPADM

## 2021-03-22 RX ORDER — ACETAMINOPHEN 325 MG/1
650 TABLET ORAL
Status: DISCONTINUED | OUTPATIENT
Start: 2021-03-25 | End: 2021-03-22

## 2021-03-22 RX ORDER — SODIUM CHLORIDE 9 MG/ML
25 INJECTION, SOLUTION INTRAVENOUS CONTINUOUS
Status: DISCONTINUED | OUTPATIENT
Start: 2021-03-25 | End: 2021-03-22

## 2021-03-22 RX ORDER — ACETAMINOPHEN 325 MG/1
650 TABLET ORAL ONCE
Status: ACTIVE | OUTPATIENT
Start: 2021-03-24 | End: 2021-03-24

## 2021-03-24 ENCOUNTER — HOSPITAL ENCOUNTER (OUTPATIENT)
Dept: INFUSION THERAPY | Age: 61
Discharge: HOME | End: 2021-03-24

## 2021-03-29 RX ORDER — DIPHENHYDRAMINE HCL 25 MG
50 CAPSULE ORAL ONCE
Status: COMPLETED | OUTPATIENT
Start: 2021-04-01 | End: 2021-04-01

## 2021-03-29 RX ORDER — ACETAMINOPHEN 325 MG/1
650 TABLET ORAL
Status: DISCONTINUED | OUTPATIENT
Start: 2021-04-01 | End: 2021-04-02 | Stop reason: HOSPADM

## 2021-03-29 RX ORDER — SODIUM CHLORIDE 9 MG/ML
25 INJECTION, SOLUTION INTRAVENOUS CONTINUOUS
Status: DISCONTINUED | OUTPATIENT
Start: 2021-04-01 | End: 2021-04-02 | Stop reason: HOSPADM

## 2021-03-29 RX ORDER — ACETAMINOPHEN 325 MG/1
650 TABLET ORAL ONCE
Status: COMPLETED | OUTPATIENT
Start: 2021-04-01 | End: 2021-04-01

## 2021-03-29 RX ORDER — DIPHENHYDRAMINE HYDROCHLORIDE 50 MG/ML
50 INJECTION, SOLUTION INTRAMUSCULAR; INTRAVENOUS
Status: DISCONTINUED | OUTPATIENT
Start: 2021-04-01 | End: 2021-04-02 | Stop reason: HOSPADM

## 2021-04-01 ENCOUNTER — HOSPITAL ENCOUNTER (OUTPATIENT)
Dept: INFUSION THERAPY | Age: 61
Discharge: HOME | End: 2021-04-01
Payer: COMMERCIAL

## 2021-04-01 VITALS
OXYGEN SATURATION: 97 % | RESPIRATION RATE: 18 BRPM | HEART RATE: 86 BPM | DIASTOLIC BLOOD PRESSURE: 84 MMHG | TEMPERATURE: 96.9 F | SYSTOLIC BLOOD PRESSURE: 138 MMHG

## 2021-04-01 PROCEDURE — 96365 THER/PROPH/DIAG IV INF INIT: CPT

## 2021-04-01 PROCEDURE — 96366 THER/PROPH/DIAG IV INF ADDON: CPT

## 2021-04-01 PROCEDURE — 74011250637 HC RX REV CODE- 250/637: Performed by: PSYCHIATRY & NEUROLOGY

## 2021-04-01 PROCEDURE — 74011250636 HC RX REV CODE- 250/636: Performed by: PSYCHIATRY & NEUROLOGY

## 2021-04-01 PROCEDURE — 96375 TX/PRO/DX INJ NEW DRUG ADDON: CPT

## 2021-04-01 RX ADMIN — SODIUM CHLORIDE 25 ML/HR: 900 INJECTION, SOLUTION INTRAVENOUS at 08:25

## 2021-04-01 RX ADMIN — DIPHENHYDRAMINE HYDROCHLORIDE 50 MG: 25 CAPSULE ORAL at 08:25

## 2021-04-01 RX ADMIN — OCRELIZUMAB 600 MG: 300 INJECTION INTRAVENOUS at 09:26

## 2021-04-01 RX ADMIN — METHYLPREDNISOLONE SODIUM SUCCINATE 125 MG: 125 INJECTION, POWDER, FOR SOLUTION INTRAMUSCULAR; INTRAVENOUS at 08:26

## 2021-04-01 RX ADMIN — ACETAMINOPHEN 650 MG: 325 TABLET ORAL at 08:25

## 2021-04-01 NOTE — PROGRESS NOTES
Outpatient Infusion Center Progress Note    0800 Pt admit to [facility] for q 6 mo Ocrevus ambulatory in stable condition. Assessment completed. No new concerns voiced. PIV established to R arm with good blood return. NS started at [facility]. Visit Vitals  /84   Pulse 86   Temp 96.9 °F (36.1 °C)   Resp 18   SpO2 97%   Breastfeeding No       Medications:  Medications Administered     0.9% sodium chloride infusion     Admin Date  04/01/2021 Action  New Bag Dose  25 mL/hr Rate  25 mL/hr Route  IntraVENous Administered By  Chris Jacob RN          acetaminophen (TYLENOL) tablet 650 mg     Admin Date  04/01/2021 Action  Given Dose  650 mg Route  Oral Administered By  Chris Jacob RN          diphenhydrAMINE (BENADRYL) capsule 50 mg     Admin Date  04/01/2021 Action  Given Dose  50 mg Route  Oral Administered By  Chris Jacob RN          methylPREDNISolone (PF) (Solu-MEDROL) injection 125 mg     Admin Date  04/01/2021 Action  Given Dose  125 mg Route  IntraVENous Administered By  Chris Jacob, LEATHA          ocrelizumab (OCREVUS) 600 mg, overfill volume 50 mL in 0.9% sodium chloride 500 mL infusion     Admin Date  04/01/2021 Action  New Bag Dose  600 mg Route  IntraVENous Administered By  Chris Jacob, LEATHA                  1320 Pt tolerated treatment well. PIV removed per protocol. D/c home ambulatory in no distress.  Pt aware of next appointment scheduled for   Future Appointments   Date Time Provider Fletcher Fox   9/27/2021 10:00  [address]

## 2022-03-25 RX ORDER — DIPHENHYDRAMINE HYDROCHLORIDE 50 MG/ML
50 INJECTION, SOLUTION INTRAMUSCULAR; INTRAVENOUS
Status: DISCONTINUED | OUTPATIENT
Start: 2022-03-28 | End: 2022-03-29 | Stop reason: HOSPADM

## 2022-03-25 RX ORDER — SODIUM CHLORIDE 9 MG/ML
25 INJECTION, SOLUTION INTRAVENOUS CONTINUOUS
Status: DISCONTINUED | OUTPATIENT
Start: 2022-03-28 | End: 2022-03-29 | Stop reason: HOSPADM

## 2022-03-25 RX ORDER — DIPHENHYDRAMINE HCL 25 MG
50 CAPSULE ORAL ONCE
Status: DISCONTINUED | OUTPATIENT
Start: 2022-03-28 | End: 2022-03-28

## 2022-03-25 RX ORDER — ACETAMINOPHEN 325 MG/1
650 TABLET ORAL
Status: DISCONTINUED | OUTPATIENT
Start: 2022-03-28 | End: 2022-03-29 | Stop reason: HOSPADM

## 2022-03-25 RX ORDER — ACETAMINOPHEN 325 MG/1
650 TABLET ORAL ONCE
Status: COMPLETED | OUTPATIENT
Start: 2022-03-28 | End: 2022-03-28

## 2022-03-28 ENCOUNTER — HOSPITAL ENCOUNTER (OUTPATIENT)
Dept: INFUSION THERAPY | Age: 62
Discharge: HOME | End: 2022-03-28
Payer: COMMERCIAL

## 2022-03-28 VITALS
DIASTOLIC BLOOD PRESSURE: 79 MMHG | HEART RATE: 72 BPM | TEMPERATURE: 96.9 F | SYSTOLIC BLOOD PRESSURE: 135 MMHG | RESPIRATION RATE: 18 BRPM

## 2022-03-28 PROCEDURE — 74011250636 HC RX REV CODE- 250/636: Performed by: PSYCHIATRY & NEUROLOGY

## 2022-03-28 PROCEDURE — 96365 THER/PROPH/DIAG IV INF INIT: CPT

## 2022-03-28 PROCEDURE — 96375 TX/PRO/DX INJ NEW DRUG ADDON: CPT

## 2022-03-28 PROCEDURE — 74011250637 HC RX REV CODE- 250/637: Performed by: PSYCHIATRY & NEUROLOGY

## 2022-03-28 PROCEDURE — 96366 THER/PROPH/DIAG IV INF ADDON: CPT

## 2022-03-28 RX ORDER — DIPHENHYDRAMINE HYDROCHLORIDE 50 MG/ML
50 INJECTION, SOLUTION INTRAMUSCULAR; INTRAVENOUS ONCE
Status: COMPLETED | OUTPATIENT
Start: 2022-03-28 | End: 2022-03-28

## 2022-03-28 RX ADMIN — METHYLPREDNISOLONE SODIUM SUCCINATE 125 MG: 125 INJECTION, POWDER, FOR SOLUTION INTRAMUSCULAR; INTRAVENOUS at 11:13

## 2022-03-28 RX ADMIN — ACETAMINOPHEN 650 MG: 325 TABLET ORAL at 11:13

## 2022-03-28 RX ADMIN — OCRELIZUMAB 600 MG: 300 INJECTION INTRAVENOUS at 11:57

## 2022-03-28 RX ADMIN — SODIUM CHLORIDE 25 ML/HR: 9 INJECTION, SOLUTION INTRAVENOUS at 11:12

## 2022-03-28 RX ADMIN — DIPHENHYDRAMINE HYDROCHLORIDE 50 MG: 50 INJECTION, SOLUTION INTRAMUSCULAR; INTRAVENOUS at 11:16

## 2022-03-28 NOTE — PROGRESS NOTES
[facility] Chemo Progress Note    Date: 2022    Name: Sera Martinez    MRN: [id number]         : 1960    0800 Ms. Soledad Ibrahim Arrived to [facility] for [facility] ambulatory in stable condition. Assessment was completed, no acute issues at this time, no new complaints voiced. Peripheral IV accessed with positive blood return. Normal Saline started at [facility]. Patient denies SOB, fever, cough, general not feeling well. Patient denies recent exposure to someone who has tested positive for COVID-19. Patient denies having contact with anyone who has a pending COVID test.      Ms. Jacques Welch vitals were reviewed. Patient Vitals for the past 12 hrs:   Temp Pulse Resp BP   22 1617 -- (P) 69 -- (P) 129/74   22 1506 -- 72 -- 135/79   22 1353 -- 66 -- 120/72   22 1317 -- 68 18 (!) 123/59   22 1236 -- 68 -- 131/77   22 0800 96.9 °F (36.1 °C) 74 18 (!) 146/79         Lab results were obtained and reviewed. No results found for this or any previous visit (from the past 12 hour(s)). Pre-medications  were administered as ordered and chemotherapy was initiated.   Medications Administered     0.9% sodium chloride infusion     Admin Date  2022 Action  New Bag Dose  25 mL/hr Rate  25 mL/hr Route  IntraVENous Administered By  Newton Ott RN          acetaminophen (TYLENOL) tablet 650 mg     Admin Date  2022 Action  Given Dose  650 mg Route  Oral Administered By  Newton Ott RN          diphenhydrAMINE (BENADRYL) injection 50 mg     Admin Date  2022 Action  Given Dose  50 mg Route  IntraVENous Administered By  Newton Ott RN          methylPREDNISolone (PF) (Solu-MEDROL) injection 125 mg     Admin Date  2022 Action  Given Dose  125 mg Route  IntraVENous Administered By  Newton Ott RN          ocrelizumab (OCREVUS) 600 mg, overfill volume 50 mL in 0.9% sodium chloride 500 mL infusion     Admin Date  2022 Action  New Bag Dose  600 mg Route  IntraVENous Administered By  Roro Pizarro, LEATHA                  5424 Patient tolerated treatment well. Peripheral IV maintained positive blood return throughout treatment. Peripheral IV flushed, removed per protocol. Patient was discharged from [facility] in stable condition. Patient aware of next appointment.      Future Appointments   Date Time Provider Fletcher Fox   9/26/2022  1:00 PM [facility] [address] [place] [address]         Law Weber RN  March 28, 2022

## 2022-07-08 ENCOUNTER — TRANSCRIBE ORDER (OUTPATIENT)
Dept: SCHEDULING | Age: 62
End: 2022-07-08

## 2022-07-08 DIAGNOSIS — Z12.31 SCREENING MAMMOGRAM FOR HIGH-RISK PATIENT: Primary | ICD-10-CM

## 2022-07-08 DIAGNOSIS — M79.605 LEFT LEG PAIN: Primary | ICD-10-CM

## 2022-07-25 ENCOUNTER — HOSPITAL ENCOUNTER (OUTPATIENT)
Dept: NUCLEAR MEDICINE | Age: 62
Discharge: HOME | End: 2022-07-25
Attending: INTERNAL MEDICINE
Payer: COMMERCIAL

## 2022-07-25 ENCOUNTER — HOSPITAL ENCOUNTER (OUTPATIENT)
Dept: MAMMOGRAPHY | Age: 62
Discharge: HOME | End: 2022-07-25
Attending: INTERNAL MEDICINE
Payer: COMMERCIAL

## 2022-07-25 DIAGNOSIS — M79.605 LEFT LEG PAIN: ICD-10-CM

## 2022-07-25 DIAGNOSIS — Z12.31 SCREENING MAMMOGRAM FOR HIGH-RISK PATIENT: ICD-10-CM

## 2022-07-25 PROCEDURE — 77067 SCR MAMMO BI INCL CAD: CPT

## 2022-07-25 PROCEDURE — 78306 BONE IMAGING WHOLE BODY: CPT

## 2022-09-19 RX ORDER — DIPHENHYDRAMINE HYDROCHLORIDE 50 MG/ML
50 INJECTION, SOLUTION INTRAMUSCULAR; INTRAVENOUS ONCE
Status: DISCONTINUED | OUTPATIENT
Start: 2022-09-26 | End: 2022-09-26

## 2022-09-19 RX ORDER — ACETAMINOPHEN 325 MG/1
650 TABLET ORAL
Status: DISCONTINUED | OUTPATIENT
Start: 2022-09-26 | End: 2022-09-27 | Stop reason: HOSPADM

## 2022-09-19 RX ORDER — DIPHENHYDRAMINE HCL 25 MG
50 CAPSULE ORAL ONCE
Status: DISCONTINUED | OUTPATIENT
Start: 2022-09-26 | End: 2022-09-26 | Stop reason: SDUPTHER

## 2022-09-19 RX ORDER — SODIUM CHLORIDE 9 MG/ML
25 INJECTION, SOLUTION INTRAVENOUS CONTINUOUS
Status: DISCONTINUED | OUTPATIENT
Start: 2022-09-26 | End: 2022-09-27 | Stop reason: HOSPADM

## 2022-09-19 RX ORDER — ACETAMINOPHEN 325 MG/1
650 TABLET ORAL ONCE
Status: COMPLETED | OUTPATIENT
Start: 2022-09-26 | End: 2022-09-26

## 2022-09-26 ENCOUNTER — HOSPITAL ENCOUNTER (OUTPATIENT)
Dept: INFUSION THERAPY | Age: 62
Discharge: HOME | End: 2022-09-26
Payer: COMMERCIAL

## 2022-09-26 VITALS
RESPIRATION RATE: 18 BRPM | TEMPERATURE: 97.8 F | DIASTOLIC BLOOD PRESSURE: 76 MMHG | SYSTOLIC BLOOD PRESSURE: 144 MMHG | HEART RATE: 65 BPM

## 2022-09-26 PROCEDURE — 96365 THER/PROPH/DIAG IV INF INIT: CPT

## 2022-09-26 PROCEDURE — 74011250636 HC RX REV CODE- 250/636: Performed by: PSYCHIATRY & NEUROLOGY

## 2022-09-26 PROCEDURE — 74011250637 HC RX REV CODE- 250/637: Performed by: PSYCHIATRY & NEUROLOGY

## 2022-09-26 PROCEDURE — 96375 TX/PRO/DX INJ NEW DRUG ADDON: CPT

## 2022-09-26 PROCEDURE — 74011250637 HC RX REV CODE- 250/637

## 2022-09-26 PROCEDURE — 96366 THER/PROPH/DIAG IV INF ADDON: CPT

## 2022-09-26 RX ORDER — DIPHENHYDRAMINE HCL 25 MG
50 CAPSULE ORAL ONCE
Status: COMPLETED | OUTPATIENT
Start: 2022-09-26 | End: 2022-09-26

## 2022-09-26 RX ORDER — DIPHENHYDRAMINE HYDROCHLORIDE 50 MG/ML
50 INJECTION, SOLUTION INTRAMUSCULAR; INTRAVENOUS
Status: DISCONTINUED | OUTPATIENT
Start: 2022-09-26 | End: 2022-09-27 | Stop reason: HOSPADM

## 2022-09-26 RX ADMIN — OCRELIZUMAB 600 MG: 300 INJECTION INTRAVENOUS at 09:27

## 2022-09-26 RX ADMIN — ACETAMINOPHEN 650 MG: 325 TABLET ORAL at 08:28

## 2022-09-26 RX ADMIN — DIPHENHYDRAMINE HYDROCHLORIDE 50 MG: 25 CAPSULE ORAL at 08:40

## 2022-09-26 RX ADMIN — SODIUM CHLORIDE 25 ML/HR: 9 INJECTION, SOLUTION INTRAVENOUS at 08:27

## 2022-09-26 RX ADMIN — METHYLPREDNISOLONE SODIUM SUCCINATE 125 MG: 125 INJECTION, POWDER, FOR SOLUTION INTRAMUSCULAR; INTRAVENOUS at 08:42

## 2022-09-26 NOTE — PROGRESS NOTES
[facility] Progress Note    Date: 2022    Name: Adrienne Marinelli    MRN: [id number]         : 1960        0730: Pt arrived ambulatory to [facility] for Ocrevus in stable condition. Assessment completed. No other complaints voiced at this time. Peripheral IV established with positive blood return. Patient Vitals for the past 12 hrs:   Temp Pulse Resp BP   22 0733 97.8 °F (36.6 °C) (P) 60 (P) 18 (!) (P) 151/75         Medications Administered       0.9% sodium chloride infusion       Admin Date  2022 Action  New Bag Dose  25 mL/hr Rate  25 mL/hr Route  IntraVENous Administered By  Radha Palomino RN              acetaminophen (TYLENOL) tablet 650 mg       Admin Date  2022 Action  Given Dose  650 mg Route  Oral Administered By  Radha Palomino RN              diphenhydrAMINE (BENADRYL) capsule 50 mg       Admin Date  2022 Action  Given Dose  50 mg Route  Oral Administered By  Radha Palomino RN              methylPREDNISolone (PF) (Solu-MEDROL) injection 125 mg       Admin Date  2022 Action  Given Dose  125 mg Route  IntraVENous Administered By  Radha Palomino RN              ocrelizumab (OCREVUS) 600 mg, overfill volume 50 mL in 0.9% sodium chloride 500 mL infusion       Admin Date  2022 Action  New Bag Dose  600 mg Route  IntraVENous Administered By  Radha Palomino RN                      0621: Tolerated treatment well, no adverse reactions noted. D/Cd from [facility] ambulatory and in no distress.       Future Appointments   Date Time Provider Fletcher Fox   3/27/2023  7:30 AM [facility]   2023  7:30 AM A[facility] Lacy Dimas RN  2022

## 2023-03-27 ENCOUNTER — HOSPITAL ENCOUNTER (OUTPATIENT)
Dept: INFUSION THERAPY | Age: 63
End: 2023-03-27

## 2023-03-27 RX ORDER — ACETAMINOPHEN 325 MG/1
650 TABLET ORAL
Status: ACTIVE | OUTPATIENT
Start: 2023-03-30 | End: 2023-03-30

## 2023-03-27 RX ORDER — DIPHENHYDRAMINE HCL 25 MG
50 CAPSULE ORAL ONCE
Status: COMPLETED | OUTPATIENT
Start: 2023-03-30 | End: 2023-03-30

## 2023-03-27 RX ORDER — SODIUM CHLORIDE 9 MG/ML
25 INJECTION, SOLUTION INTRAVENOUS CONTINUOUS
Status: DISPENSED | OUTPATIENT
Start: 2023-03-30 | End: 2023-03-30

## 2023-03-27 RX ORDER — ACETAMINOPHEN 325 MG/1
650 TABLET ORAL ONCE
Status: COMPLETED | OUTPATIENT
Start: 2023-03-30 | End: 2023-03-30

## 2023-03-27 RX ORDER — DIPHENHYDRAMINE HYDROCHLORIDE 50 MG/ML
50 INJECTION, SOLUTION INTRAMUSCULAR; INTRAVENOUS
Status: ACTIVE | OUTPATIENT
Start: 2023-03-30 | End: 2023-03-30

## 2023-03-30 ENCOUNTER — HOSPITAL ENCOUNTER (OUTPATIENT)
Dept: INFUSION THERAPY | Age: 63
Discharge: HOME | End: 2023-03-30
Payer: COMMERCIAL

## 2023-03-30 VITALS
TEMPERATURE: 97.7 F | HEART RATE: 61 BPM | RESPIRATION RATE: 18 BRPM | DIASTOLIC BLOOD PRESSURE: 76 MMHG | SYSTOLIC BLOOD PRESSURE: 145 MMHG

## 2023-03-30 PROCEDURE — 74011250636 HC RX REV CODE- 250/636: Performed by: NURSE PRACTITIONER

## 2023-03-30 PROCEDURE — 96375 TX/PRO/DX INJ NEW DRUG ADDON: CPT

## 2023-03-30 PROCEDURE — 74011250637 HC RX REV CODE- 250/637: Performed by: NURSE PRACTITIONER

## 2023-03-30 PROCEDURE — 96415 CHEMO IV INFUSION ADDL HR: CPT

## 2023-03-30 PROCEDURE — 96413 CHEMO IV INFUSION 1 HR: CPT

## 2023-03-30 RX ADMIN — METHYLPREDNISOLONE SODIUM SUCCINATE 125 MG: 125 INJECTION, POWDER, FOR SOLUTION INTRAMUSCULAR; INTRAVENOUS at 08:39

## 2023-03-30 RX ADMIN — OCRELIZUMAB 600 MG: 300 INJECTION INTRAVENOUS at 09:07

## 2023-03-30 RX ADMIN — DIPHENHYDRAMINE HYDROCHLORIDE 50 MG: 25 CAPSULE ORAL at 08:38

## 2023-03-30 RX ADMIN — SODIUM CHLORIDE 25 ML/HR: 9 INJECTION, SOLUTION INTRAVENOUS at 08:44

## 2023-03-30 RX ADMIN — ACETAMINOPHEN 650 MG: 325 TABLET ORAL at 08:38

## 2023-03-30 NOTE — PROGRESS NOTES
[facility] Chemotherapy Progress Note    Date: 2023    Name: Alvin Wilkes    MRN: [id number]         : 1960      0730 Pt admit to [facility] for [address] [facility] ambulatory in stable condition. Assessment completed. No new concerns voiced. Peripheral IV established with positive blood return. Ms. Liseth Bennett vitals were reviewed. Patient Vitals for the past 12 hrs:   Temp Pulse Resp BP   23 1300 -- -- -- (!) 145/76   23 0733 97.7 °F (36.5 °C) 61 18 138/67           Pre-medications  were administered as ordered and chemotherapy was initiated.   Medications Administered       0.9% sodium chloride infusion       Admin Date  2023 Action  New Bag Dose  25 mL/hr Rate  25 mL/hr Route  IntraVENous Administered By  Nehemiah Gomes RN              acetaminophen (TYLENOL) tablet 650 mg       Admin Date  2023 Action  Given Dose  650 mg Route  Oral Administered By  Nehemiah Gomes RN              diphenhydrAMINE (BENADRYL) capsule 50 mg       Admin Date  2023 Action  Given Dose  50 mg Route  Oral Administered By  Nehemiah Gomes RN              methylPREDNISolone (PF) (Solu-MEDROL) injection 125 mg       Admin Date  2023 Action  Given Dose  125 mg Route  IntraVENous Administered By  Nehemiah Gomes RN              ocrelizumab (OCREVUS) 600 mg, overfill volume 50 mL in 0.9% sodium chloride 500 mL infusion       Admin Date  2023 Action  New Bag Dose  600 mg Rate  40 mL/hr Route  IntraVENous Administered By  Nehemiah Gomes RN               Admin Date  2023 Action  Transfusion Rate Change Dose   Rate  80 mL/hr Route  IntraVENous Administered By  Nehemiah Gomes RN               Admin Date  2023 Action  Transfusion Rate Change Dose   Rate  120 mL/hr Route  IntraVENous Administered By  Nehemiah Gomes RN               Admin Date  2023 Action  Transfusion Rate Change Dose   Rate  160 mL/hr Route  IntraVENous Administered By  Nehemiah Gomes RN Admin Date  03/30/2023 Action  Transfusion Rate Change Dose   Rate  200 mL/hr Route  IntraVENous Administered By  Jorge Ramires RN                    Two nurses verified prior to administering:Drug name, Drug doseInfusion volume or drug volume when prepared in a syringe, Rate of administration, Route of administration, Expiration dates and/or times, Appearance and physical integrity of the drugs, Rate set on infusion pump, when used, Sequencing of drug administration. 1300 Pt tolerated treatment well. Peripheral IV maintained positive blood return throughout treatment. D/c home ambulatory in no distress. Pt aware of next appointment scheduled for .     Future Appointments   Date Time Provider Fletcher Fox   9/27/2023  7:30 AM [address] [address] Amiral Courbet, RN  March 30, 2023

## 2023-09-18 RX ORDER — ACETAMINOPHEN 325 MG/1
650 TABLET ORAL EVERY 4 HOURS PRN
Status: CANCELLED
Start: 2023-09-27

## 2023-09-18 RX ORDER — DIPHENHYDRAMINE HYDROCHLORIDE 50 MG/ML
50 INJECTION INTRAMUSCULAR; INTRAVENOUS EVERY 4 HOURS PRN
Status: CANCELLED
Start: 2023-09-27

## 2023-09-27 ENCOUNTER — APPOINTMENT (OUTPATIENT)
Dept: INFUSION THERAPY | Age: 63
End: 2023-09-27

## 2023-09-27 ENCOUNTER — HOSPITAL ENCOUNTER (OUTPATIENT)
Facility: HOSPITAL | Age: 63
Setting detail: INFUSION SERIES
End: 2023-09-27
Payer: COMMERCIAL

## 2023-09-27 VITALS
BODY MASS INDEX: 39.27 KG/M2 | HEIGHT: 64 IN | WEIGHT: 230 LBS | DIASTOLIC BLOOD PRESSURE: 83 MMHG | RESPIRATION RATE: 18 BRPM | SYSTOLIC BLOOD PRESSURE: 153 MMHG | TEMPERATURE: 96.7 F | HEART RATE: 64 BPM

## 2023-09-27 DIAGNOSIS — G35 MS (MULTIPLE SCLEROSIS) (HCC): Primary | ICD-10-CM

## 2023-09-27 PROCEDURE — 96375 TX/PRO/DX INJ NEW DRUG ADDON: CPT

## 2023-09-27 PROCEDURE — 96366 THER/PROPH/DIAG IV INF ADDON: CPT

## 2023-09-27 PROCEDURE — 2580000003 HC RX 258: Performed by: NURSE PRACTITIONER

## 2023-09-27 PROCEDURE — 96415 CHEMO IV INFUSION ADDL HR: CPT

## 2023-09-27 PROCEDURE — 6370000000 HC RX 637 (ALT 250 FOR IP): Performed by: NURSE PRACTITIONER

## 2023-09-27 PROCEDURE — 96413 CHEMO IV INFUSION 1 HR: CPT

## 2023-09-27 PROCEDURE — 6360000002 HC RX W HCPCS: Performed by: NURSE PRACTITIONER

## 2023-09-27 PROCEDURE — 96365 THER/PROPH/DIAG IV INF INIT: CPT

## 2023-09-27 RX ORDER — SODIUM CHLORIDE 9 MG/ML
5-250 INJECTION, SOLUTION INTRAVENOUS PRN
Status: DISCONTINUED | OUTPATIENT
Start: 2023-09-27 | End: 2023-09-28 | Stop reason: HOSPADM

## 2023-09-27 RX ORDER — ACETAMINOPHEN 325 MG/1
650 TABLET ORAL EVERY 4 HOURS PRN
Start: 2023-09-27

## 2023-09-27 RX ORDER — SODIUM CHLORIDE 9 MG/ML
5-250 INJECTION, SOLUTION INTRAVENOUS PRN
Status: CANCELLED | OUTPATIENT
Start: 2023-09-27

## 2023-09-27 RX ORDER — DIPHENHYDRAMINE HYDROCHLORIDE 50 MG/ML
50 INJECTION INTRAMUSCULAR; INTRAVENOUS ONCE
Status: CANCELLED | OUTPATIENT
Start: 2023-09-27 | End: 2023-09-27

## 2023-09-27 RX ORDER — ACETAMINOPHEN 325 MG/1
650 TABLET ORAL ONCE
Status: CANCELLED | OUTPATIENT
Start: 2023-09-27 | End: 2023-09-27

## 2023-09-27 RX ORDER — DIPHENHYDRAMINE HYDROCHLORIDE 50 MG/ML
50 INJECTION INTRAMUSCULAR; INTRAVENOUS ONCE
Status: COMPLETED | OUTPATIENT
Start: 2023-09-27 | End: 2023-09-27

## 2023-09-27 RX ORDER — DIPHENHYDRAMINE HYDROCHLORIDE 50 MG/ML
50 INJECTION INTRAMUSCULAR; INTRAVENOUS EVERY 4 HOURS PRN
Start: 2023-09-27

## 2023-09-27 RX ORDER — ACETAMINOPHEN 325 MG/1
650 TABLET ORAL ONCE
Status: COMPLETED | OUTPATIENT
Start: 2023-09-27 | End: 2023-09-27

## 2023-09-27 RX ADMIN — ACETAMINOPHEN 650 MG: 325 TABLET ORAL at 07:55

## 2023-09-27 RX ADMIN — METHYLPREDNISOLONE SODIUM SUCCINATE 125 MG: 125 INJECTION, POWDER, FOR SOLUTION INTRAMUSCULAR; INTRAVENOUS at 07:55

## 2023-09-27 RX ADMIN — OCRELIZUMAB 600 MG: 300 INJECTION INTRAVENOUS at 08:55

## 2023-09-27 RX ADMIN — SODIUM CHLORIDE 25 ML/HR: 900 INJECTION, SOLUTION INTRAVENOUS at 07:55

## 2023-09-27 RX ADMIN — DIPHENHYDRAMINE HYDROCHLORIDE 50 MG: 50 INJECTION, SOLUTION INTRAMUSCULAR; INTRAVENOUS at 08:00

## 2024-03-27 ENCOUNTER — HOSPITAL ENCOUNTER (OUTPATIENT)
Facility: HOSPITAL | Age: 64
Setting detail: INFUSION SERIES
End: 2024-03-27

## 2024-03-28 RX ORDER — ACETAMINOPHEN 325 MG/1
650 TABLET ORAL EVERY 4 HOURS PRN
OUTPATIENT
Start: 2024-03-29

## 2024-03-28 RX ORDER — DIPHENHYDRAMINE HYDROCHLORIDE 50 MG/ML
50 INJECTION INTRAMUSCULAR; INTRAVENOUS EVERY 4 HOURS PRN
OUTPATIENT
Start: 2024-03-29

## 2024-03-28 RX ORDER — SODIUM CHLORIDE 9 MG/ML
5-250 INJECTION, SOLUTION INTRAVENOUS PRN
OUTPATIENT
Start: 2024-03-29

## 2024-03-28 RX ORDER — SODIUM CHLORIDE 9 MG/ML
INJECTION, SOLUTION INTRAVENOUS CONTINUOUS
OUTPATIENT
Start: 2024-03-29

## 2024-03-28 RX ORDER — HEPARIN 100 UNIT/ML
500 SYRINGE INTRAVENOUS PRN
OUTPATIENT
Start: 2024-03-29

## 2024-03-29 ENCOUNTER — HOSPITAL ENCOUNTER (OUTPATIENT)
Facility: HOSPITAL | Age: 64
Setting detail: INFUSION SERIES
Discharge: HOME | End: 2024-03-29
Payer: COMMERCIAL

## 2024-03-29 VITALS
DIASTOLIC BLOOD PRESSURE: 77 MMHG | WEIGHT: 232.2 LBS | TEMPERATURE: 97.8 F | RESPIRATION RATE: 18 BRPM | BODY MASS INDEX: 39.86 KG/M2 | SYSTOLIC BLOOD PRESSURE: 133 MMHG | HEART RATE: 75 BPM

## 2024-03-29 DIAGNOSIS — G35 MS (MULTIPLE SCLEROSIS) (HCC): Primary | ICD-10-CM

## 2024-03-29 PROCEDURE — 6360000002 HC RX W HCPCS: Performed by: NURSE PRACTITIONER

## 2024-03-29 PROCEDURE — 96375 TX/PRO/DX INJ NEW DRUG ADDON: CPT

## 2024-03-29 PROCEDURE — 6370000000 HC RX 637 (ALT 250 FOR IP): Performed by: NURSE PRACTITIONER

## 2024-03-29 PROCEDURE — 96413 CHEMO IV INFUSION 1 HR: CPT

## 2024-03-29 PROCEDURE — 2580000003 HC RX 258: Performed by: NURSE PRACTITIONER

## 2024-03-29 PROCEDURE — 96415 CHEMO IV INFUSION ADDL HR: CPT

## 2024-03-29 RX ORDER — SODIUM CHLORIDE 9 MG/ML
5-250 INJECTION, SOLUTION INTRAVENOUS PRN
OUTPATIENT
Start: 2024-09-27

## 2024-03-29 RX ORDER — DIPHENHYDRAMINE HYDROCHLORIDE 50 MG/ML
50 INJECTION INTRAMUSCULAR; INTRAVENOUS ONCE
Status: COMPLETED | OUTPATIENT
Start: 2024-03-29 | End: 2024-03-29

## 2024-03-29 RX ORDER — ACETAMINOPHEN 325 MG/1
650 TABLET ORAL EVERY 4 HOURS PRN
OUTPATIENT
Start: 2024-09-27

## 2024-03-29 RX ORDER — SODIUM CHLORIDE 0.9 % (FLUSH) 0.9 %
5-40 SYRINGE (ML) INJECTION PRN
OUTPATIENT
Start: 2024-09-27

## 2024-03-29 RX ORDER — ACETAMINOPHEN 325 MG/1
650 TABLET ORAL ONCE
Status: COMPLETED | OUTPATIENT
Start: 2024-03-29 | End: 2024-03-29

## 2024-03-29 RX ORDER — ACETAMINOPHEN 325 MG/1
650 TABLET ORAL ONCE
OUTPATIENT
Start: 2024-09-27 | End: 2024-09-27

## 2024-03-29 RX ORDER — HEPARIN 100 UNIT/ML
500 SYRINGE INTRAVENOUS PRN
OUTPATIENT
Start: 2024-09-27

## 2024-03-29 RX ORDER — SODIUM CHLORIDE 9 MG/ML
INJECTION, SOLUTION INTRAVENOUS CONTINUOUS
OUTPATIENT
Start: 2024-09-27

## 2024-03-29 RX ORDER — DIPHENHYDRAMINE HYDROCHLORIDE 50 MG/ML
50 INJECTION INTRAMUSCULAR; INTRAVENOUS ONCE
OUTPATIENT
Start: 2024-09-27 | End: 2024-09-27

## 2024-03-29 RX ORDER — SODIUM CHLORIDE 9 MG/ML
5-250 INJECTION, SOLUTION INTRAVENOUS PRN
Status: DISCONTINUED | OUTPATIENT
Start: 2024-03-29 | End: 2024-03-30 | Stop reason: HOSPADM

## 2024-03-29 RX ORDER — SODIUM CHLORIDE 0.9 % (FLUSH) 0.9 %
5-40 SYRINGE (ML) INJECTION PRN
Status: DISCONTINUED | OUTPATIENT
Start: 2024-03-29 | End: 2024-03-30 | Stop reason: HOSPADM

## 2024-03-29 RX ORDER — DIPHENHYDRAMINE HYDROCHLORIDE 50 MG/ML
50 INJECTION INTRAMUSCULAR; INTRAVENOUS EVERY 4 HOURS PRN
OUTPATIENT
Start: 2024-09-27

## 2024-03-29 RX ADMIN — METHYLPREDNISOLONE SODIUM SUCCINATE 125 MG: 125 INJECTION, POWDER, FOR SOLUTION INTRAMUSCULAR; INTRAVENOUS at 08:05

## 2024-03-29 RX ADMIN — DIPHENHYDRAMINE HYDROCHLORIDE 50 MG: 50 INJECTION INTRAMUSCULAR; INTRAVENOUS at 08:07

## 2024-03-29 RX ADMIN — ACETAMINOPHEN 650 MG: 325 TABLET ORAL at 08:01

## 2024-03-29 RX ADMIN — SODIUM CHLORIDE 50 ML/HR: 9 INJECTION, SOLUTION INTRAVENOUS at 07:51

## 2024-03-29 RX ADMIN — OCRELIZUMAB 600 MG: 300 INJECTION INTRAVENOUS at 09:11

## 2024-03-29 ASSESSMENT — PAIN SCALES - GENERAL: PAINLEVEL_OUTOF10: 5

## 2024-03-29 NOTE — PLAN OF CARE
[facility] Short Note                       Date: 2024    Name: Rowan Duron    MRN: [id number]         : 1960    Pt admit to [facility] for Ocrevus ambulatory in stable condition. Assessment completed and documented in flowsheets. No acute concerns at this time. No labs ordered/due. Pt denies active infection and taking antibiotics.    Ms. Duron's vitals were reviewed prior to and after treatment.   Patient Vitals for the past 12 hrs:   Temp Pulse Resp BP   24 1259 -- 75 -- 133/77   24 1112 -- 60 -- 133/81   24 1040 -- 72 -- 139/81   24 1011 -- 60 -- 137/77   24 0941 -- 60 -- 138/78   24 0737 97.8 °F (36.6 °C) 68 18 (!) 144/79       Medications given: via PIV in R forearm  Medications Administered         0.9 % sodium chloride infusion Admin Date  2024 Action  New Bag Dose  50 mL/hr Rate  50 mL/hr Route  IntraVENous Administered By  Leslye Gordon RN        acetaminophen (TYLENOL) tablet 650 mg Admin Date  2024 Action  Given Dose  650 mg Rate   Route  Oral Administered By  Leslye Gordon RN        diphenhydrAMINE (BENADRYL) injection 50 mg Admin Date  2024 Action  Given Dose  50 mg Rate   Route  IntraVENous Administered By  Leslye Gordon RN        methylPREDNISolone sodium (PF) (SOLU-MEDROL PF) injection 125 mg Admin Date  2024 Action  Given Dose  125 mg Rate   Route  IntraVENous Administered By  Leslye Gordon RN        ocrelizumab (OCREVUS) 600 mg in sodium chloride 0.9 % 500 mL IVPB Admin Date  2024 Action  New Bag Dose  600 mg Rate  40 mL/hr Route  IntraVENous Administered By  Leslye Gordon RN        ocrelizumab (OCREVUS) 600 mg in sodium chloride 0.9 % 500 mL IVPB Admin Date  2024 Action  Rate/Dose Change Dose   Rate  80 mL/hr Route  IntraVENous Administered By  Leslye Gordon RN        ocrelizumab (OCREVUS) 600 mg in sodium chloride 0.9 % 500 mL IVPB Admin Date  2024 Action  Rate/Dose Change Dose   Rate  120 mL/hr

## 2024-09-27 ENCOUNTER — HOSPITAL ENCOUNTER (OUTPATIENT)
Facility: HOSPITAL | Age: 64
Setting detail: INFUSION SERIES
End: 2024-09-27

## 2024-10-16 ENCOUNTER — HOSPITAL ENCOUNTER (OUTPATIENT)
Facility: HOSPITAL | Age: 64
Setting detail: INFUSION SERIES
Discharge: HOME | End: 2024-10-16
Payer: COMMERCIAL

## 2024-10-16 VITALS
TEMPERATURE: 97.6 F | DIASTOLIC BLOOD PRESSURE: 77 MMHG | WEIGHT: 230.4 LBS | BODY MASS INDEX: 39.55 KG/M2 | RESPIRATION RATE: 18 BRPM | HEART RATE: 64 BPM | SYSTOLIC BLOOD PRESSURE: 133 MMHG

## 2024-10-16 DIAGNOSIS — G35 MS (MULTIPLE SCLEROSIS) (HCC): Primary | ICD-10-CM

## 2024-10-16 PROCEDURE — 96413 CHEMO IV INFUSION 1 HR: CPT

## 2024-10-16 PROCEDURE — 2580000003 HC RX 258: Performed by: NURSE PRACTITIONER

## 2024-10-16 PROCEDURE — 6370000000 HC RX 637 (ALT 250 FOR IP): Performed by: NURSE PRACTITIONER

## 2024-10-16 PROCEDURE — 96415 CHEMO IV INFUSION ADDL HR: CPT

## 2024-10-16 PROCEDURE — 96365 THER/PROPH/DIAG IV INF INIT: CPT

## 2024-10-16 PROCEDURE — 96375 TX/PRO/DX INJ NEW DRUG ADDON: CPT

## 2024-10-16 PROCEDURE — 6360000002 HC RX W HCPCS: Performed by: NURSE PRACTITIONER

## 2024-10-16 PROCEDURE — 96366 THER/PROPH/DIAG IV INF ADDON: CPT

## 2024-10-16 RX ORDER — ACETAMINOPHEN 325 MG/1
650 TABLET ORAL ONCE
Status: COMPLETED | OUTPATIENT
Start: 2024-10-16 | End: 2024-10-16

## 2024-10-16 RX ORDER — SODIUM CHLORIDE 9 MG/ML
5-250 INJECTION, SOLUTION INTRAVENOUS PRN
Status: DISCONTINUED | OUTPATIENT
Start: 2024-10-16 | End: 2024-10-16

## 2024-10-16 RX ORDER — ACETAMINOPHEN 325 MG/1
650 TABLET ORAL ONCE
OUTPATIENT
Start: 2025-03-26 | End: 2025-03-26

## 2024-10-16 RX ORDER — DIPHENHYDRAMINE HYDROCHLORIDE 50 MG/ML
50 INJECTION INTRAMUSCULAR; INTRAVENOUS ONCE
OUTPATIENT
Start: 2025-03-26 | End: 2025-03-26

## 2024-10-16 RX ORDER — SODIUM CHLORIDE 9 MG/ML
5-250 INJECTION, SOLUTION INTRAVENOUS PRN
OUTPATIENT
Start: 2025-03-26

## 2024-10-16 RX ORDER — ACETAMINOPHEN 325 MG/1
650 TABLET ORAL EVERY 4 HOURS PRN
OUTPATIENT
Start: 2025-03-26

## 2024-10-16 RX ORDER — HEPARIN 100 UNIT/ML
500 SYRINGE INTRAVENOUS PRN
OUTPATIENT
Start: 2025-03-26

## 2024-10-16 RX ORDER — DIPHENHYDRAMINE HYDROCHLORIDE 50 MG/ML
50 INJECTION INTRAMUSCULAR; INTRAVENOUS EVERY 4 HOURS PRN
OUTPATIENT
Start: 2025-03-26

## 2024-10-16 RX ORDER — DIPHENHYDRAMINE HYDROCHLORIDE 50 MG/ML
50 INJECTION INTRAMUSCULAR; INTRAVENOUS ONCE
Status: COMPLETED | OUTPATIENT
Start: 2024-10-16 | End: 2024-10-16

## 2024-10-16 RX ORDER — SODIUM CHLORIDE 0.9 % (FLUSH) 0.9 %
5-40 SYRINGE (ML) INJECTION PRN
OUTPATIENT
Start: 2025-03-26

## 2024-10-16 RX ORDER — SODIUM CHLORIDE 9 MG/ML
INJECTION, SOLUTION INTRAVENOUS CONTINUOUS
OUTPATIENT
Start: 2025-03-26

## 2024-10-16 RX ADMIN — OCRELIZUMAB 600 MG: 300 INJECTION INTRAVENOUS at 10:31

## 2024-10-16 RX ADMIN — ACETAMINOPHEN 650 MG: 325 TABLET ORAL at 09:41

## 2024-10-16 RX ADMIN — METHYLPREDNISOLONE SODIUM SUCCINATE 125 MG: 125 INJECTION, POWDER, FOR SOLUTION INTRAMUSCULAR; INTRAVENOUS at 09:41

## 2024-10-16 RX ADMIN — DIPHENHYDRAMINE HYDROCHLORIDE 50 MG: 50 INJECTION INTRAMUSCULAR; INTRAVENOUS at 09:45

## 2024-10-16 ASSESSMENT — PAIN DESCRIPTION - ORIENTATION: ORIENTATION: ANTERIOR

## 2024-10-16 ASSESSMENT — PAIN SCALES - GENERAL: PAINLEVEL_OUTOF10: 2

## 2024-10-16 ASSESSMENT — PAIN DESCRIPTION - LOCATION: LOCATION: HEAD

## 2024-10-16 ASSESSMENT — PAIN DESCRIPTION - DESCRIPTORS: DESCRIPTORS: ACHING

## 2024-10-16 NOTE — PROGRESS NOTES
[facility] Progress Note    Date: October 16, 2024        0930: Pt arrived ambulatory to [facility] for Ocrevus in stable condition.  Assessment completed. PIV placed to right wrist with positive blood return. Labs drawn and sent for processing.        Patient Vitals for the past 12 hrs:   Temp Pulse Resp BP   10/16/24 1435 -- 64 -- 133/77   10/16/24 1300 -- 65 -- 127/70   10/16/24 1200 -- 68 -- (!) 148/72   10/16/24 1058 -- 63 -- (!) 156/71   10/16/24 0915 97.6 °F (36.4 °C) 60 18 137/72         Medications Administered         acetaminophen (TYLENOL) tablet 650 mg Admin Date  10/16/2024 Action  Given Dose  650 mg Rate   Route  Oral Documented By  Mary Jane Landis RN        diphenhydrAMINE (BENADRYL) injection 50 mg Admin Date  10/16/2024 Action  Given Dose  50 mg Rate   Route  IntraVENous Documented By  Mary Jane Landis RN        methylPREDNISolone sodium (PF) (SOLU-MEDROL PF) injection 125 mg Admin Date  10/16/2024 Action  Given Dose  125 mg Rate   Route  IntraVENous Documented By  Mary Jane Landis RN        ocrelizumab (OCREVUS) 600 mg in sodium chloride 0.9 % 500 mL IVPB Admin Date  10/16/2024 Action  New Bag Dose  600 mg Rate  40 mL/hr Route  IntraVENous Documented By  Mary Jane Landis RN        ocrelizumab (OCREVUS) 600 mg in sodium chloride 0.9 % 500 mL IVPB Admin Date  10/16/2024 Action  Rate/Dose Change Dose   Rate  80 mL/hr Route  IntraVENous Documented By  Mary Jane Landis RN        ocrelizumab (OCREVUS) 600 mg in sodium chloride 0.9 % 500 mL IVPB Admin Date  10/16/2024 Action  Rate/Dose Change Dose   Rate  120 mL/hr Route  IntraVENous Documented By  Mary Jane Landis RN        ocrelizumab (OCREVUS) 600 mg in sodium chloride 0.9 % 500 mL IVPB Admin Date  10/16/2024 Action  Rate/Dose Change Dose   Rate  160 mL/hr Route  IntraVENous Documented By  Mary Jane Landis RN        ocrelizumab (OCREVUS) 600 mg in sodium chloride 0.9 % 500 mL IVPB Admin Date  10/16/2024 Action  Rate/Dose Change Dose   Rate  200 mL/hr  Route  IntraVENous Documented By  Mary Jane Landis, RN               Ms. Duron tolerated the infusion, and had no complaints.    PIV flushed and removed. 2x2 and coban placed    [facility]. Patient is aware of next scheduled [facility] appointment.         No future appointments.      Mary Jane Landis, RN, RN  October 16, 2024

## 2025-05-09 RX ORDER — ACETAMINOPHEN 325 MG/1
650 TABLET ORAL EVERY 4 HOURS PRN
Status: CANCELLED
Start: 2025-05-16

## 2025-05-09 RX ORDER — ACETAMINOPHEN 325 MG/1
650 TABLET ORAL ONCE
Status: CANCELLED | OUTPATIENT
Start: 2025-05-16 | End: 2025-05-16

## 2025-05-09 RX ORDER — DIPHENHYDRAMINE HYDROCHLORIDE 50 MG/ML
50 INJECTION, SOLUTION INTRAMUSCULAR; INTRAVENOUS EVERY 4 HOURS PRN
Status: CANCELLED
Start: 2025-05-16

## 2025-05-09 RX ORDER — SODIUM CHLORIDE 9 MG/ML
5-250 INJECTION, SOLUTION INTRAVENOUS PRN
Status: CANCELLED | OUTPATIENT
Start: 2025-05-16

## 2025-05-09 RX ORDER — DIPHENHYDRAMINE HYDROCHLORIDE 50 MG/ML
50 INJECTION, SOLUTION INTRAMUSCULAR; INTRAVENOUS ONCE
Status: CANCELLED | OUTPATIENT
Start: 2025-05-16 | End: 2025-05-16

## 2025-05-16 ENCOUNTER — HOSPITAL ENCOUNTER (OUTPATIENT)
Facility: HOSPITAL | Age: 65
Setting detail: INFUSION SERIES
Discharge: HOME | End: 2025-05-16
Payer: COMMERCIAL

## 2025-05-16 VITALS
WEIGHT: 232 LBS | SYSTOLIC BLOOD PRESSURE: 139 MMHG | BODY MASS INDEX: 41.11 KG/M2 | DIASTOLIC BLOOD PRESSURE: 74 MMHG | OXYGEN SATURATION: 97 % | RESPIRATION RATE: 18 BRPM | TEMPERATURE: 97.8 F | HEIGHT: 63 IN | HEART RATE: 69 BPM

## 2025-05-16 DIAGNOSIS — G35 MS (MULTIPLE SCLEROSIS) (HCC): Primary | ICD-10-CM

## 2025-05-16 PROCEDURE — 96415 CHEMO IV INFUSION ADDL HR: CPT

## 2025-05-16 PROCEDURE — 96413 CHEMO IV INFUSION 1 HR: CPT

## 2025-05-16 PROCEDURE — 96375 TX/PRO/DX INJ NEW DRUG ADDON: CPT

## 2025-05-16 PROCEDURE — 6360000002 HC RX W HCPCS: Performed by: PSYCHIATRY & NEUROLOGY

## 2025-05-16 PROCEDURE — 96366 THER/PROPH/DIAG IV INF ADDON: CPT

## 2025-05-16 PROCEDURE — 96365 THER/PROPH/DIAG IV INF INIT: CPT

## 2025-05-16 PROCEDURE — 2580000003 HC RX 258: Performed by: PSYCHIATRY & NEUROLOGY

## 2025-05-16 PROCEDURE — 6370000000 HC RX 637 (ALT 250 FOR IP): Performed by: PSYCHIATRY & NEUROLOGY

## 2025-05-16 RX ORDER — ACETAMINOPHEN 325 MG/1
650 TABLET ORAL ONCE
OUTPATIENT
Start: 2025-11-14 | End: 2025-11-14

## 2025-05-16 RX ORDER — ACETAMINOPHEN 325 MG/1
650 TABLET ORAL EVERY 4 HOURS PRN
Start: 2025-11-14

## 2025-05-16 RX ORDER — SODIUM CHLORIDE 9 MG/ML
5-250 INJECTION, SOLUTION INTRAVENOUS PRN
OUTPATIENT
Start: 2025-11-14

## 2025-05-16 RX ORDER — ACETAMINOPHEN 325 MG/1
650 TABLET ORAL ONCE
Status: COMPLETED | OUTPATIENT
Start: 2025-05-16 | End: 2025-05-16

## 2025-05-16 RX ORDER — DIPHENHYDRAMINE HYDROCHLORIDE 50 MG/ML
50 INJECTION, SOLUTION INTRAMUSCULAR; INTRAVENOUS EVERY 4 HOURS PRN
Status: ACTIVE | OUTPATIENT
Start: 2025-05-16 | End: 2025-05-16

## 2025-05-16 RX ORDER — SODIUM CHLORIDE 9 MG/ML
5-250 INJECTION, SOLUTION INTRAVENOUS PRN
Status: DISCONTINUED | OUTPATIENT
Start: 2025-05-16 | End: 2025-05-17 | Stop reason: HOSPADM

## 2025-05-16 RX ORDER — ACETAMINOPHEN 325 MG/1
650 TABLET ORAL EVERY 4 HOURS PRN
Status: ACTIVE | OUTPATIENT
Start: 2025-05-16 | End: 2025-05-16

## 2025-05-16 RX ORDER — DIPHENHYDRAMINE HYDROCHLORIDE 50 MG/ML
50 INJECTION, SOLUTION INTRAMUSCULAR; INTRAVENOUS EVERY 4 HOURS PRN
Start: 2025-11-14

## 2025-05-16 RX ORDER — DIPHENHYDRAMINE HYDROCHLORIDE 50 MG/ML
50 INJECTION, SOLUTION INTRAMUSCULAR; INTRAVENOUS ONCE
OUTPATIENT
Start: 2025-11-14 | End: 2025-11-14

## 2025-05-16 RX ORDER — DIPHENHYDRAMINE HYDROCHLORIDE 50 MG/ML
50 INJECTION, SOLUTION INTRAMUSCULAR; INTRAVENOUS ONCE
Status: COMPLETED | OUTPATIENT
Start: 2025-05-16 | End: 2025-05-16

## 2025-05-16 RX ADMIN — OCRELIZUMAB 600 MG: 300 INJECTION INTRAVENOUS at 08:56

## 2025-05-16 RX ADMIN — ACETAMINOPHEN 650 MG: 325 TABLET ORAL at 08:17

## 2025-05-16 RX ADMIN — DIPHENHYDRAMINE HYDROCHLORIDE 50 MG: 50 INJECTION INTRAMUSCULAR; INTRAVENOUS at 08:30

## 2025-05-16 RX ADMIN — METHYLPREDNISOLONE SODIUM SUCCINATE 125 MG: 125 INJECTION, POWDER, FOR SOLUTION INTRAMUSCULAR; INTRAVENOUS at 08:32

## 2025-05-16 RX ADMIN — SODIUM CHLORIDE 50 ML/HR: 0.9 INJECTION, SOLUTION INTRAVENOUS at 08:31

## 2025-05-16 ASSESSMENT — PAIN SCALES - GENERAL: PAINLEVEL_OUTOF10: 0

## 2025-05-16 NOTE — PLAN OF CARE
[facility] Visit Notes                 Date: May 16, 2025  Name: Rowan Duron  MRN: [id number]       : 1960    Pt admit to [facility] for Ocrevus (Maintenance Infusion)   No acute distress noted. Patient is ambulatory and in stable condition.   Denies flu-like symptoms. No immediate needs or concerns for this visit.     IV placed peripherally in Right forearm, 24g with positive blood return.     History of Ocrevus infusion reaction in the past.   Ocrevus infusion initiated at 40 ml/hr and titrated up to max rate of 200 ml/hr. See MAR for details.     PIV removed post infusion. Coban and Gauze applied to site.   Patient aware of upcoming appointment. Patient declined post- monitoring time. Education provided.     Ms. Duron's vitals were reviewed prior to treatment.   Patient Vitals for the past 12 hrs:   Temp Pulse Resp BP SpO2   25 1251 -- 69 18 139/74 --   25 1057 -- 65 -- (!) 147/75 --   25 1027 -- 67 18 (!) 150/70 --   25 0957 -- 59 -- (!) 143/81 --   25 0927 -- 57 16 (!) 143/78 --   25 0757 97.8 °F (36.6 °C) 67 16 138/77 97 %     Medications Administered         0.9 % sodium chloride infusion Admin Date  2025 Action  New Bag Dose  50 mL/hr Rate  50 mL/hr Route  IntraVENous Documented By  Airam Umana RN        acetaminophen (TYLENOL) tablet 650 mg Admin Date  2025 Action  Given Dose  650 mg Rate   Route  Oral Documented By  Airam Umana RN        diphenhydrAMINE (BENADRYL) injection 50 mg Admin Date  2025 Action  Given Dose  50 mg Rate   Route  IntraVENous Documented By  Airam Umana RN        methylPREDNISolone sodium (PF) (SOLU-MEDROL PF) injection 125 mg Admin Date  2025 Action  Given Dose  125 mg Rate   Route  IntraVENous Documented By  Airam Umana RN        ocrelizumab (OCREVUS) 600 mg in sodium chloride 0.9 % 500 mL IVPB Admin Date  2025 Action  New Bag Dose  600 mg Rate  40 mL/hr Route  IntraVENous Documented By  Airam Umana, RN